# Patient Record
Sex: FEMALE | Race: WHITE | Employment: UNEMPLOYED | URBAN - METROPOLITAN AREA
[De-identification: names, ages, dates, MRNs, and addresses within clinical notes are randomized per-mention and may not be internally consistent; named-entity substitution may affect disease eponyms.]

---

## 2018-02-28 ENCOUNTER — OFFICE VISIT (OUTPATIENT)
Dept: SURGERY | Age: 43
End: 2018-02-28

## 2018-02-28 VITALS
TEMPERATURE: 98.4 F | WEIGHT: 223.2 LBS | SYSTOLIC BLOOD PRESSURE: 137 MMHG | HEIGHT: 62 IN | OXYGEN SATURATION: 100 % | DIASTOLIC BLOOD PRESSURE: 87 MMHG | RESPIRATION RATE: 15 BRPM | BODY MASS INDEX: 41.07 KG/M2 | HEART RATE: 72 BPM

## 2018-02-28 DIAGNOSIS — E55.9 HYPOVITAMINOSIS D: ICD-10-CM

## 2018-02-28 DIAGNOSIS — E66.01 MORBID OBESITY (HCC): Primary | ICD-10-CM

## 2018-02-28 RX ORDER — ALBUTEROL SULFATE 90 UG/1
AEROSOL, METERED RESPIRATORY (INHALATION)
COMMUNITY
End: 2020-08-24 | Stop reason: SDUPTHER

## 2018-02-28 RX ORDER — ESTRADIOL 2 MG/1
TABLET ORAL
Refills: 5 | COMMUNITY
Start: 2018-01-11

## 2018-02-28 RX ORDER — HYDROXYCHLOROQUINE SULFATE 200 MG/1
TABLET, FILM COATED ORAL
Refills: 1 | COMMUNITY
Start: 2018-01-11 | End: 2019-05-03 | Stop reason: ALTCHOICE

## 2018-02-28 RX ORDER — ERGOCALCIFEROL 1.25 MG/1
50000 CAPSULE ORAL
COMMUNITY
Start: 2016-11-08 | End: 2019-07-02

## 2018-02-28 NOTE — PATIENT INSTRUCTIONS
If you have any questions or concerns about today's appointment, the verbal and/or written instructions you were given for follow up care, please call our office at [phone].     [facility] - [address]    [phone] office  [phone]

## 2018-02-28 NOTE — LETTER
2018 2:06 PM 
 
Patient:  Osvaldo Doe YOB: 1975 Date of Visit: 2018 MD [address] 
[address] [address] [facility] [address] VIA Facsimile: [phone] Dear Brenda Clark MD, Thank you for referring Ms. Jana Lucas to [address] [id number] for evaluation and treatment. Below are the relevant portions of my assessment and plan of care. Initial Consultation for Bariatric Surgery Template (Gastric Sleeve) Osvaldo Doe is a 41 y.o. female who comes into the office today for initial consultation for the surgical options for the treatment of morbid obesity. The patient initially identified obesity at the age of 27 and at age 27 weighed 80 lbs. She has tried a variety of unsupervised weight-loss attempts including Weight Watchers and family physician, but has yet to meet with lasting success. Maximum weight lost on a diet is about 25 lbs, but that the weight loss always seems to return. Today, the patient is  Height: 5' 2\" (157.5 cm) tall, Weight: 101.2 kg (223 lb 3.2 oz) lbs for a Body mass index is 40.82 kg/(m^2). It is due to the patient's severe obesity, that the patient is now seeking out bariatric surgery, specifically, sleeve gastrectomy. Past Medical History:  
Diagnosis Date  Arthritis  Autoimmune disease ([address] Rheumatoid arthrtis  Sleep apnea Past Surgical History:  
Procedure Laterality Date [address] [facility] [address]  HX  SECTION    
 HX CHOLECYSTECTOMY  HX HYSTERECTOMY   Current Outpatient Prescriptions Medication Sig Dispense Refill  albuterol (PROVENTIL HFA, VENTOLIN HFA, PROAIR HFA) 90 mcg/actuation inhaler Take  inhaled by mouth.  ergocalciferol (ERGOCALCIFEROL) 50,000 unit capsule 50,000 Units.  ESOMEPRAZOLE MAGNESIUM (NEXIUM PO) Take  by Mouth.  hydroxychloroquine (PLAQUENIL) 200 mg tablet TAKE 1 TABLET BY MOUTH TWICE A DAY WITH FOOD OR MILK  1  
 estradiol (ESTRACE) 2 mg tablet TAKE 1 TABLET BY MOUTH EVERY DAY  5 Allergies Allergen Reactions  Promethazine Unknown (comments) Other reaction(s): psychological reaction Hallucinations Social History Substance Use Topics  Smoking status: Never Smoker  Smokeless tobacco: Never Used  Alcohol use No  
 
 
Family History Problem Relation Age of Onset  Diabetes Mother Elbessy Liz Arthritis-osteo Mother  Hypertension Mother  Diabetes Father  COPD Father Family Status Relation Status  Mother Alive  Father Alive Review of Systems:  Positive in BOLD 
 
CONST: Fever, weight loss, fatigue or chills GI: Nausea, vomiting, abdominal pain, change in bowel habits, hematochezia, melena, and GERD and Cleveland's esophagus INTEG: Dermatitis, abnormal moles HEENT: Recent changes in vision, vertigo, epistaxis, dysphagia and hoarseness CV: Chest pain, palpitations, HTN, edema and varicosities RESP: Cough, shortness of breath, wheezing, hemoptysis, snoring and reactive airway disease : Hematuria, dysuria, frequency, urgency, nocturia and stress urinary incontinence MS: Weakness, joint pain and arthritis ENDO: Diabetes, thyroid disease, polyuria, polydipsia, polyphagia, poor wound healing, heat intolerance, cold intolerance LYMPH/HEME: Anemia, bruising and history of blood transfusions NEURO: Dizziness, headache, fainting, seizures and stroke PSYCH: Anxiety and depression Physical Exam 
 
Visit Vitals  /87 (BP 1 Location: Right arm, BP Patient Position: Sitting)  Pulse 72  Temp 98.4 °F (36.9 °C) (Oral)  Resp 15  Ht 5' 2\" (1.575 m)  Wt 101.2 kg (223 lb 3.2 oz)  LMP Comment: hysterectomy 04/2014  SpO2 100%  BMI 40.82 kg/m2 Pre op weight: 223.2 EBW: 98.2 Wt loss to date: 0 General: 41 y.o.) female in no acute distress. Morbidly obese in abdomen and hips - gynecoid pattern HEENT: Normocephalic, atraumatic, Pupils equal and reactive, nasopharynx clear, oropharynx clear and moist without lesions NECK: Supple, no lymphadenopathy, thyromegaly, carotid bruits or jugular venous distension. trachea midline RESP: Clear to auscultation bilaterally, no wheezes, rhonchi, or rales, normal respiratory excursion CV: Regular rate and rhythm, no murmurs, rubs or gallops. 3+/4 pulses in bilateral dorsalis pedis and posterior tibialis. No distal edema or varicosities. ABD: Soft, nontender, nondistended, normoactive bowel sounds, no hernias, no hepatosplenomegaly, easily palpable costal margins, gynecoid distribution healed pfannenstiel and lap scars. Extremities: Warm, well perfused, no tenderness or swelling, normal gait/station Neuro: Sensation and strength grossly intact and symmetrical 
Psych: Alert and oriented to person, place, and time. Impression: 
 
Kerney Boas is a 41 y.o. female who is suffering from morbid obesity with a BMI of 41  and comorbidities including obstructive sleep apnea - CPAP and weight related arthopathies  who would benefit from bariatric surgery. We have had an extensive discussion with regard to the risks, benefits and likely outcomes of the operation. We've discussed the restrictive and malabsorptive nature of the gastric bypass and compared and contrasted with the sleeve gastrectomy. The patient understands the likelihood of losing approximately 80% of their excess weight in 12 to 18 months. She also understands the risks including but not limited to bleeding, infection, need for reoperation, ulcers, leaks and strictures, bowel obstruction secondary to adhesions and internal hernias, DVT, PE, heart attack, stroke, and death.  Patient also understands risks of inadequate weight loss, excess weight loss, vitamin insufficiency, protein malnutrition, excess skin, and loss of hair. We have reviewed the components of a successful postoperative course including requirement for a high protein, low carbohydrate diet, 60 oz a day of zero calorie liquids, daily vitamin supplementation, daily exercise, regular follow-up, and participation in support groups. At this time we will enroll the patient in our bariatric program, undertake routine laboratory evaluation, chest X-ray, EKG, possible UGI and evaluation by  nutritionist as well as psychologist and pending their satisfactory completion of the preop evaluation, plan to perform a gastric bypass. Thank you very much for your referral of Ms. Cas Vernon. If you have questions, please do not hesitate to call me. I look forward to following Ms. Alicia Cuevas along with you and will keep you updated as to her progress.   
 
 
 
 
Sincerely, 
 
 
Deirdre Dsouza MD

## 2018-02-28 NOTE — PROGRESS NOTES
Chief Complaint   Patient presents with    Morbid Obesity     consult     Body mass index is 40.82 kg/(m^2).

## 2018-02-28 NOTE — COMMUNICATION BODY
Initial Consultation for Bariatric Surgery Template (Gastric Sleeve)    Amanda Guardado is a 41 y.o. female who comes into the office today for initial consultation for the surgical options for the treatment of morbid obesity. The patient initially identified obesity at the age of 27 and at age 27 weighed 80 lbs. She has tried a variety of unsupervised weight-loss attempts including Weight Watchers and family physician, but has yet to meet with lasting success. Maximum weight lost on a diet is about 25 lbs, but that the weight loss always seems to return. Today, the patient is  Height: 5' 2\" (157.5 cm) tall, Weight: 101.2 kg (223 lb 3.2 oz) lbs for a Body mass index is 40.82 kg/(m^2). It is due to the patient's severe obesity, that the patient is now seeking out bariatric surgery, specifically, sleeve gastrectomy. Past Medical History:   Diagnosis Date    Arthritis     Autoimmune disease ([address]     Rheumatoid arthrtis    Sleep apnea        Past Surgical History:   Procedure Laterality Date    HX APPENDECTOMY      HX  SECTION      HX CHOLECYSTECTOMY      HX HYSTERECTOMY         Current Outpatient Prescriptions   Medication Sig Dispense Refill    albuterol (PROVENTIL HFA, VENTOLIN HFA, PROAIR HFA) 90 mcg/actuation inhaler Take  inhaled by mouth.  ergocalciferol (ERGOCALCIFEROL) 50,000 unit capsule 50,000 Units.  ESOMEPRAZOLE MAGNESIUM (NEXIUM PO) Take  by Mouth.       hydroxychloroquine (PLAQUENIL) 200 mg tablet TAKE 1 TABLET BY MOUTH TWICE A DAY WITH FOOD OR MILK  1    estradiol (ESTRACE) 2 mg tablet TAKE 1 TABLET BY MOUTH EVERY DAY  5       Allergies   Allergen Reactions    Promethazine Unknown (comments)     Other reaction(s): psychological reaction  Hallucinations       Social History   Substance Use Topics    Smoking status: Never Smoker    Smokeless tobacco: Never Used    Alcohol use No       Family History   Problem Relation Age of Onset    Diabetes Mother    Pat Castillo Arthritis-osteo Mother     Hypertension Mother     Diabetes Father     COPD Father        Family Status   Relation Status    Mother [de-identified]    Father Alive       Review of Systems:  Positive in BOLD    CONST: Fever, weight loss, fatigue or chills  GI: Nausea, vomiting, abdominal pain, change in bowel habits, hematochezia, melena, and GERD and Cleveland's esophagus   INTEG: Dermatitis, abnormal moles  HEENT: Recent changes in vision, vertigo, epistaxis, dysphagia and hoarseness  CV: Chest pain, palpitations, HTN, edema and varicosities  RESP: Cough, shortness of breath, wheezing, hemoptysis, snoring and reactive airway disease  : Hematuria, dysuria, frequency, urgency, nocturia and stress urinary incontinence   MS: Weakness, joint pain and arthritis  ENDO: Diabetes, thyroid disease, polyuria, polydipsia, polyphagia, poor wound healing, heat intolerance, cold intolerance  LYMPH/HEME: Anemia, bruising and history of blood transfusions  NEURO: Dizziness, headache, fainting, seizures and stroke  PSYCH: Anxiety and depression      Physical Exam    Visit Vitals    /87 (BP 1 Location: Right arm, BP Patient Position: Sitting)    Pulse 72    Temp 98.4 °F (36.9 °C) (Oral)    Resp 15    Ht 5' 2\" (1.575 m)    Wt 101.2 kg (223 lb 3.2 oz)    LMP Comment: hysterectomy 04/2014    SpO2 100%    BMI 40.82 kg/m2       Pre op weight: 223.2  EBW: 98.2  Wt loss to date: 0       General: 41 y.o.) female in no acute distress. Morbidly obese in abdomen and hips - gynecoid pattern  HEENT: Normocephalic, atraumatic, Pupils equal and reactive, nasopharynx clear, oropharynx clear and moist without lesions  NECK: Supple, no lymphadenopathy, thyromegaly, carotid bruits or jugular venous distension. trachea midline  RESP: Clear to auscultation bilaterally, no wheezes, rhonchi, or rales, normal respiratory excursion  CV: Regular rate and rhythm, no murmurs, rubs or gallops.  3+/4 pulses in bilateral dorsalis pedis and posterior tibialis. No distal edema or varicosities. ABD: Soft, nontender, nondistended, normoactive bowel sounds, no hernias, no hepatosplenomegaly, easily palpable costal margins, gynecoid distribution healed pfannenstiel and lap scars. Extremities: Warm, well perfused, no tenderness or swelling, normal gait/station  Neuro: Sensation and strength grossly intact and symmetrical  Psych: Alert and oriented to person, place, and time. Impression:    Piyush Pelayo is a 41 y.o. female who is suffering from morbid obesity with a BMI of 41  and comorbidities including obstructive sleep apnea - CPAP and weight related arthopathies  who would benefit from bariatric surgery. We have had an extensive discussion with regard to the risks, benefits and likely outcomes of the operation. We've discussed the restrictive and malabsorptive nature of the gastric bypass and compared and contrasted with the sleeve gastrectomy. The patient understands the likelihood of losing approximately 80% of their excess weight in 12 to 18 months. She also understands the risks including but not limited to bleeding, infection, need for reoperation, ulcers, leaks and strictures, bowel obstruction secondary to adhesions and internal hernias, DVT, PE, heart attack, stroke, and death. Patient also understands risks of inadequate weight loss, excess weight loss, vitamin insufficiency, protein malnutrition, excess skin, and loss of hair. We have reviewed the components of a successful postoperative course including requirement for a high protein, low carbohydrate diet, 60 oz a day of zero calorie liquids, daily vitamin supplementation, daily exercise, regular follow-up, and participation in support groups.  At this time we will enroll the patient in our bariatric program, undertake routine laboratory evaluation, chest X-ray, EKG, possible UGI and evaluation by  nutritionist as well as psychologist and pending their satisfactory completion of the preop evaluation, plan to perform a gastric bypass.

## 2018-02-28 NOTE — PROGRESS NOTES
Initial Consultation for Bariatric Surgery Template (Gastric Sleeve)    Ivett Trent is a 41 y.o. female who comes into the office today for initial consultation for the surgical options for the treatment of morbid obesity. The patient initially identified obesity at the age of 27 and at age 27 weighed 80 lbs. She has tried a variety of unsupervised weight-loss attempts including Weight Watchers and family physician, but has yet to meet with lasting success. Maximum weight lost on a diet is about 25 lbs, but that the weight loss always seems to return. Today, the patient is  Height: 5' 2\" (157.5 cm) tall, Weight: 101.2 kg (223 lb 3.2 oz) lbs for a Body mass index is 40.82 kg/(m^2). It is due to the patient's severe obesity, that the patient is now seeking out bariatric surgery, specifically, sleeve gastrectomy. Past Medical History:   Diagnosis Date    Arthritis     Autoimmune disease ([address]     Rheumatoid arthrtis    Sleep apnea        Past Surgical History:   Procedure Laterality Date    HX APPENDECTOMY      HX  SECTION      HX CHOLECYSTECTOMY      HX HYSTERECTOMY         Current Outpatient Prescriptions   Medication Sig Dispense Refill    albuterol (PROVENTIL HFA, VENTOLIN HFA, PROAIR HFA) 90 mcg/actuation inhaler Take  inhaled by mouth.  ergocalciferol (ERGOCALCIFEROL) 50,000 unit capsule 50,000 Units.  ESOMEPRAZOLE MAGNESIUM (NEXIUM PO) Take  by Mouth.       hydroxychloroquine (PLAQUENIL) 200 mg tablet TAKE 1 TABLET BY MOUTH TWICE A DAY WITH FOOD OR MILK  1    estradiol (ESTRACE) 2 mg tablet TAKE 1 TABLET BY MOUTH EVERY DAY  5       Allergies   Allergen Reactions    Promethazine Unknown (comments)     Other reaction(s): psychological reaction  Hallucinations       Social History   Substance Use Topics    Smoking status: Never Smoker    Smokeless tobacco: Never Used    Alcohol use No       Family History   Problem Relation Age of Onset    Diabetes Mother    [facility] Arthritis-osteo Mother     Hypertension Mother     Diabetes Father     COPD Father        Family Status   Relation Status    Mother [de-identified]    Father Alive       Review of Systems:  Positive in BOLD    CONST: Fever, weight loss, fatigue or chills  GI: Nausea, vomiting, abdominal pain, change in bowel habits, hematochezia, melena, and GERD and Cleveland's esophagus   INTEG: Dermatitis, abnormal moles  HEENT: Recent changes in vision, vertigo, epistaxis, dysphagia and hoarseness  CV: Chest pain, palpitations, HTN, edema and varicosities  RESP: Cough, shortness of breath, wheezing, hemoptysis, snoring and reactive airway disease  : Hematuria, dysuria, frequency, urgency, nocturia and stress urinary incontinence   MS: Weakness, joint pain and arthritis  ENDO: Diabetes, thyroid disease, polyuria, polydipsia, polyphagia, poor wound healing, heat intolerance, cold intolerance  LYMPH/HEME: Anemia, bruising and history of blood transfusions  NEURO: Dizziness, headache, fainting, seizures and stroke  PSYCH: Anxiety and depression      Physical Exam    Visit Vitals    /87 (BP 1 Location: Right arm, BP Patient Position: Sitting)    Pulse 72    Temp 98.4 °F (36.9 °C) (Oral)    Resp 15    Ht 5' 2\" (1.575 m)    Wt 101.2 kg (223 lb 3.2 oz)    LMP Comment: hysterectomy 04/2014    SpO2 100%    BMI 40.82 kg/m2       Pre op weight: 223.2  EBW: 98.2  Wt loss to date: 0       General: 41 y.o.) female in no acute distress. Morbidly obese in abdomen and hips - gynecoid pattern  HEENT: Normocephalic, atraumatic, Pupils equal and reactive, nasopharynx clear, oropharynx clear and moist without lesions  NECK: Supple, no lymphadenopathy, thyromegaly, carotid bruits or jugular venous distension. trachea midline  RESP: Clear to auscultation bilaterally, no wheezes, rhonchi, or rales, normal respiratory excursion  CV: Regular rate and rhythm, no murmurs, rubs or gallops.  3+/4 pulses in bilateral dorsalis pedis and posterior tibialis. No distal edema or varicosities. ABD: Soft, nontender, nondistended, normoactive bowel sounds, no hernias, no hepatosplenomegaly, easily palpable costal margins, gynecoid distribution healed pfannenstiel and lap scars. Extremities: Warm, well perfused, no tenderness or swelling, normal gait/station  Neuro: Sensation and strength grossly intact and symmetrical  Psych: Alert and oriented to person, place, and time. Impression:    Joanie Rodriguez is a 41 y.o. female who is suffering from morbid obesity with a BMI of 41  and comorbidities including obstructive sleep apnea - CPAP and weight related arthopathies  who would benefit from bariatric surgery. We have had an extensive discussion with regard to the risks, benefits and likely outcomes of the operation. We've discussed the restrictive and malabsorptive nature of the gastric bypass and compared and contrasted with the sleeve gastrectomy. The patient understands the likelihood of losing approximately 80% of their excess weight in 12 to 18 months. She also understands the risks including but not limited to bleeding, infection, need for reoperation, ulcers, leaks and strictures, bowel obstruction secondary to adhesions and internal hernias, DVT, PE, heart attack, stroke, and death. Patient also understands risks of inadequate weight loss, excess weight loss, vitamin insufficiency, protein malnutrition, excess skin, and loss of hair. We have reviewed the components of a successful postoperative course including requirement for a high protein, low carbohydrate diet, 60 oz a day of zero calorie liquids, daily vitamin supplementation, daily exercise, regular follow-up, and participation in support groups.  At this time we will enroll the patient in our bariatric program, undertake routine laboratory evaluation, chest X-ray, EKG, possible UGI and evaluation by  nutritionist as well as psychologist and pending their satisfactory completion of the preop evaluation, plan to perform a gastric bypass.

## 2018-03-27 ENCOUNTER — DOCUMENTATION ONLY (OUTPATIENT)
Dept: SURGERY | Age: 43
End: 2018-03-27

## 2018-03-27 NOTE — PROGRESS NOTES
[facility]  [address] [facility] [address] [facility], [facility]    Patient's Name: Piyush Pelayo                                  Age: 41 y.o. YOB: 1975                                          Sex: female  Date: 03/22/2018  Insurance: [employer]                          Session: 1 of 3               Surgeon:  Dr. Harmony Rascon    Height: 5'2\"                    Weight: 225#           Starting weight with surgeon: 223#          Do you smoke?: no    Alcohol Intake:   I do not drink at all:x      Class Guidelines    Pt. Understand that if they miss a month, depending on insurance, they may have to start over. Pt. Also understand that the expectation is to lose  Or maintain weight. Weight gain may result in delaying surgery until the weight is off. EATING HABITS AND BEHAVIORS:  Pt. Struggles With:  Liquid calories:   Skipping breakfast: x  Eating foods with a high amount of carbohydrates:   Eating High fat foods:   Eating large portions:   Making poor snack choices:  Eating out frequently: x  Skipping meals: x  Reading labels:   Drinking >48 oz fluids daily:   Getting sufficient physical activity/exercise:   Night time eating/snacking: x  Binge eating:   Eating often, even when not hungry (grazing):   Giving into peer pressure regarding eating/drinking:   Other:     Each class we spend time discussing the pre-op diet, diet progression and vitamin/mineral requirements as well as the Key Diet Principles. Each class we also cover lowering carbohydrate consumption. Keeping carbohydrates <25 grams per meal and avoiding added sugars is emphasized. Patient is educated on the effects that  carbohydrates and bad fats have on them. PHYSICAL ACTIVITY/EXERCISE:   Patient's activity is increasing because patient plans to or is:  Walking more: x  Other aerobic activity such as running, swimming, Gael, kickboxing ect. : x  Chair exercises:    Active in resistance training such as weight lifting:   Other:     Each class we spend time discussing the importance of increasing activity. Patient can start with 10 minutes of walking daily or chair exercises and build from there. BEHAVIOR MODIFICATION:  Making modifications to behavior, patient plans to or is:  Eating only when hungry not to treat stress, anger, tiredness or boredom:   Eating away from TV, computer and phone: x  Eating on a small plate: x  Eats slowly, chewing food well: x  Other: We spend time in each class discussing the importance of breaking bad habits and how to do this. I encourage pt.s to self evaluate and look for those crucial moments in which they are making these poor choices. I recommend a food journal which can help identify when/where//why/who we are with when we compromise and make exceptions that are poor choices. ADDITIONAL INFORMATION:  Specific changes patient made since the last class:  1st visit.     Brunilda Mora RD  03/22/2018

## 2018-04-19 ENCOUNTER — HOSPITAL ENCOUNTER (OUTPATIENT)
Dept: LAB | Age: 43
Discharge: HOME | End: 2018-04-19
Payer: COMMERCIAL

## 2018-04-19 ENCOUNTER — HOSPITAL ENCOUNTER (OUTPATIENT)
Dept: PREADMISSION TESTING | Age: 43
Discharge: HOME | End: 2018-04-19
Payer: COMMERCIAL

## 2018-04-19 DIAGNOSIS — E55.9 HYPOVITAMINOSIS D: ICD-10-CM

## 2018-04-19 DIAGNOSIS — E66.01 MORBID OBESITY (HCC): ICD-10-CM

## 2018-04-19 LAB
25(OH)D3 SERPL-MCNC: 27 NG/ML (ref 30–100)
ALBUMIN SERPL-MCNC: 4.4 G/DL (ref 3.4–5)
ALBUMIN/GLOB SERPL: 1.3 {RATIO} (ref 0.8–1.7)
ALP SERPL-CCNC: 108 U/L (ref 45–117)
ALT SERPL-CCNC: 39 U/L (ref 13–56)
ANION GAP SERPL CALC-SCNC: 10 MMOL/L (ref 3–18)
AST SERPL-CCNC: 21 U/L (ref 15–37)
ATRIAL RATE: 67 BPM
BASOPHILS # BLD: 0 K/UL (ref 0–0.06)
BASOPHILS NFR BLD: 0 % (ref 0–2)
BILIRUB SERPL-MCNC: 0.6 MG/DL (ref 0.2–1)
BUN SERPL-MCNC: 14 MG/DL (ref 7–18)
BUN/CREAT SERPL: 20 (ref 12–20)
CALCIUM SERPL-MCNC: 9.4 MG/DL (ref 8.5–10.1)
CALCULATED P AXIS, ECG09: 52 DEGREES
CALCULATED R AXIS, ECG10: 30 DEGREES
CALCULATED T AXIS, ECG11: 36 DEGREES
CHLORIDE SERPL-SCNC: 103 MMOL/L (ref 100–108)
CHOLEST SERPL-MCNC: 165 MG/DL
CO2 SERPL-SCNC: 28 MMOL/L (ref 21–32)
CREAT SERPL-MCNC: 0.69 MG/DL (ref 0.6–1.3)
DIAGNOSIS, 93000: NORMAL
DIFFERENTIAL METHOD BLD: ABNORMAL
EOSINOPHIL # BLD: 0.3 K/UL (ref 0–0.4)
EOSINOPHIL NFR BLD: 4 % (ref 0–5)
ERYTHROCYTE [DISTWIDTH] IN BLOOD BY AUTOMATED COUNT: 13.3 % (ref 11.6–14.5)
FERRITIN SERPL-MCNC: 52 NG/ML (ref 8–388)
FOLATE SERPL-MCNC: 17.2 NG/ML (ref 3.1–17.5)
GLOBULIN SER CALC-MCNC: 3.4 G/DL (ref 2–4)
GLUCOSE SERPL-MCNC: 89 MG/DL (ref 74–99)
HCT VFR BLD AUTO: 45.7 % (ref 35–45)
HDLC SERPL-MCNC: 49 MG/DL (ref 40–60)
HDLC SERPL: 3.4 {RATIO} (ref 0–5)
HGB BLD-MCNC: 15.2 G/DL (ref 12–16)
IRON SERPL-MCNC: 46 UG/DL (ref 50–175)
LDLC SERPL CALC-MCNC: 93.6 MG/DL (ref 0–100)
LIPID PROFILE,FLP: NORMAL
LYMPHOCYTES # BLD: 3.1 K/UL (ref 0.9–3.6)
LYMPHOCYTES NFR BLD: 41 % (ref 21–52)
MCH RBC QN AUTO: 29.2 PG (ref 24–34)
MCHC RBC AUTO-ENTMCNC: 33.3 G/DL (ref 31–37)
MCV RBC AUTO: 87.7 FL (ref 74–97)
MONOCYTES # BLD: 0.4 K/UL (ref 0.05–1.2)
MONOCYTES NFR BLD: 6 % (ref 3–10)
NEUTS SEG # BLD: 3.7 K/UL (ref 1.8–8)
NEUTS SEG NFR BLD: 49 % (ref 40–73)
P-R INTERVAL, ECG05: 134 MS
PLATELET # BLD AUTO: 299 K/UL (ref 135–420)
PMV BLD AUTO: 10.8 FL (ref 9.2–11.8)
POTASSIUM SERPL-SCNC: 3.9 MMOL/L (ref 3.5–5.5)
PROT SERPL-MCNC: 7.8 G/DL (ref 6.4–8.2)
Q-T INTERVAL, ECG07: 424 MS
QRS DURATION, ECG06: 86 MS
QTC CALCULATION (BEZET), ECG08: 448 MS
RBC # BLD AUTO: 5.21 M/UL (ref 4.2–5.3)
SODIUM SERPL-SCNC: 141 MMOL/L (ref 136–145)
TRIGL SERPL-MCNC: 112 MG/DL (ref ?–150)
TSH SERPL DL<=0.05 MIU/L-ACNC: 1.63 UIU/ML (ref 0.36–3.74)
VENTRICULAR RATE, ECG03: 67 BPM
VIT B12 SERPL-MCNC: 490 PG/ML (ref 211–911)
VLDLC SERPL CALC-MCNC: 22.4 MG/DL
WBC # BLD AUTO: 7.5 K/UL (ref 4.6–13.2)

## 2018-04-19 PROCEDURE — 83540 ASSAY OF IRON: CPT | Performed by: SURGERY

## 2018-04-19 PROCEDURE — 85025 COMPLETE CBC W/AUTO DIFF WBC: CPT | Performed by: SURGERY

## 2018-04-19 PROCEDURE — 82607 VITAMIN B-12: CPT | Performed by: SURGERY

## 2018-04-19 PROCEDURE — 80061 LIPID PANEL: CPT | Performed by: SURGERY

## 2018-04-19 PROCEDURE — 93005 ELECTROCARDIOGRAM TRACING: CPT

## 2018-04-19 PROCEDURE — 82728 ASSAY OF FERRITIN: CPT | Performed by: SURGERY

## 2018-04-19 PROCEDURE — 84443 ASSAY THYROID STIM HORMONE: CPT | Performed by: SURGERY

## 2018-04-19 PROCEDURE — 82306 VITAMIN D 25 HYDROXY: CPT | Performed by: SURGERY

## 2018-04-19 PROCEDURE — 80053 COMPREHEN METABOLIC PANEL: CPT | Performed by: SURGERY

## 2018-04-19 PROCEDURE — 84425 ASSAY OF VITAMIN B-1: CPT | Performed by: SURGERY

## 2018-04-21 LAB — VIT B1 BLD-SCNC: 124.2 NMOL/L (ref 66.5–200)

## 2018-04-25 ENCOUNTER — OFFICE VISIT (OUTPATIENT)
Dept: SURGERY | Age: 43
End: 2018-04-25

## 2018-04-25 VITALS
RESPIRATION RATE: 20 BRPM | HEIGHT: 62 IN | HEART RATE: 69 BPM | SYSTOLIC BLOOD PRESSURE: 127 MMHG | TEMPERATURE: 97.8 F | WEIGHT: 219 LBS | BODY MASS INDEX: 40.3 KG/M2 | DIASTOLIC BLOOD PRESSURE: 95 MMHG

## 2018-04-25 DIAGNOSIS — E66.01 MORBID OBESITY (HCC): Primary | ICD-10-CM

## 2018-04-25 DIAGNOSIS — E55.9 HYPOVITAMINOSIS D: ICD-10-CM

## 2018-04-25 DIAGNOSIS — Z99.89 OSA ON CPAP: ICD-10-CM

## 2018-04-25 DIAGNOSIS — G47.33 OSA ON CPAP: ICD-10-CM

## 2018-04-25 NOTE — PROGRESS NOTES
Bariatric Midtrial   Returns during WLT to discuss status. Having no issues. Making positive changes. Still wants a bypass    Past Medical History:   Diagnosis Date    Arthritis     Autoimmune disease ([address]     Rheumatoid arthrtis    Sleep apnea      Past Surgical History:   Procedure Laterality Date    HX APPENDECTOMY      HX  SECTION      HX CHOLECYSTECTOMY      HX HYSTERECTOMY       Allergies   Allergen Reactions    Promethazine Unknown (comments)     Other reaction(s): psychological reaction  Hallucinations     Current Outpatient Prescriptions   Medication Sig Dispense Refill    albuterol (PROVENTIL HFA, VENTOLIN HFA, PROAIR HFA) 90 mcg/actuation inhaler Take  inhaled by mouth.  ergocalciferol (ERGOCALCIFEROL) 50,000 unit capsule 50,000 Units.  ESOMEPRAZOLE MAGNESIUM (NEXIUM PO) Take  by Mouth.       hydroxychloroquine (PLAQUENIL) 200 mg tablet TAKE 1 TABLET BY MOUTH TWICE A DAY WITH FOOD OR MILK  1    estradiol (ESTRACE) 2 mg tablet TAKE 1 TABLET BY MOUTH EVERY DAY  5     Social History     Social History    Marital status:      Spouse name: N/A    Number of children: N/A    Years of education: N/A     Social History Main Topics    Smoking status: Never Smoker    Smokeless tobacco: Never Used    Alcohol use No    Drug use: None    Sexual activity: Yes     Other Topics Concern    None     Social History Narrative     Family History   Problem Relation Age of Onset    Diabetes Mother     Arthritis-osteo Mother     Hypertension Mother    [place]  Diabetes Father     COPD Father      Family Status   Relation Status    Mother Alive    Father Alive       No change in ROS     Visit Vitals    BP (!) 127/95 (BP 1 Location: Left arm, BP Patient Position: At rest)    Pulse 69    Temp 97.8 °F (36.6 °C) (Oral)    Resp 20    Ht 5' 2\" (1.575 m)    Wt 99.3 kg (219 lb)    BMI 40.06 kg/m2       Pulm: CTA   CV: RRR   Abd: soft, nontender, easily palp costal margin and  no hernias     Labs: all ok, mild hypovit D   EKG - NSR   Psych- approved  Nutr - 1/3 complete  Imp:   Doing well   Proceed with completion of WLT. All questions answered. Hoping for surgery in July.

## 2018-04-25 NOTE — PROGRESS NOTES
Gael Wolf is a 41 y.o. female who presents today with   Chief Complaint   Patient presents with    Morbid Obesity     Mid Trial                1. Have you been to the ER, urgent care clinic since your last visit? Hospitalized since your last visit? No    2. Have you seen or consulted any other health care providers outside of the [facility] since your last visit? Include any pap smears or colon screening.  No

## 2018-04-25 NOTE — MR AVS SNAPSHOT
[facility] 
 
 
 [address] [place] [address] [id number] 
[phone] Patient: Marcy Lara MRN: [id number] :1975 Visit Information Date & Time Provider Department Dept. Phone Encounter #  
 2018 10:00 AM J Carlos Amin MD [facility] [phone] [id number] Your Appointments 2018 10:30 AM  
NUTRITION COUNSELING with [facility] DIETICIAN [facility] [place] ([address]) Appt Note: 3 of 3  
 [address] [place] [address]  
  
   
 [address] [place] [address] 2018  2:45 PM  
Office Visit with J Carlos Amin MD  
[address] [place] [address] [address]) Appt Note: ready to schedule surgery [address] [place] [address] [place]  
  
   
 [address] [facility] [address] Upcoming Health Maintenance Date Due DTaP/Tdap/Td series (1 - Tdap) 1998 PAP AKA CERVICAL CYTOLOGY 1998 Influenza Age 5 to Adult 2017 Allergies as of 2018  Never Reviewed Severity Noted Reaction Type Reactions Promethazine Medium 2008    Unknown (comments) Other reaction(s): psychological reaction Hallucinations Current Immunizations  Never Reviewed No immunizations on file. Not reviewed this visit You Were Diagnosed With   
  
 Codes Comments Morbid obesity ([facility]    -  Primary ICD-10-CM: E66.01 
ICD-9-CM: 278.01   
 BMI 40.0-44.9, adult ([facility]     ICD-10-CM: Z68.41 
ICD-9-CM: V85.41 Hypovitaminosis D     ICD-10-CM: E55.9 ICD-9-CM: 268.9 GIL on CPAP     ICD-10-CM: G47.33, Z99.89 ICD-9-CM: 327.23, V46.8 Vitals BP Pulse Temp Resp Height(growth percentile) Weight(growth percentile) (!) 127/95 (BP 1 Location: Left arm, BP Patient Position: At rest) 69 97.8 °F (36.6 °C) (Oral) 20 5' 2\" (1.575 m) 219 lb (99.3 kg) BMI OB Status Smoking Status 40.06 kg/m2 Hysterectomy Never Smoker Vitals History BMI and BSA Data Body Mass Index Body Surface Area 40.06 kg/m 2 2.08 m 2 Your Updated Medication List  
  
   
This list is accurate as of 4/25/18 10:43 AM.  Always use your most recent med list.  
  
  
  
  
 albuterol 90 mcg/actuation inhaler Commonly known as:  PROVENTIL HFA, VENTOLIN HFA, PROAIR HFA Take  inhaled by mouth.  
  
 ergocalciferol 50,000 unit capsule Commonly known as:  ERGOCALCIFEROL  
50,000 Units. estradiol 2 mg tablet Commonly known as:  ESTRACE  
TAKE 1 TABLET BY MOUTH EVERY DAY  
  
 hydroxychloroquine 200 mg tablet Commonly known as:  PLAQUENIL  
TAKE 1 TABLET BY MOUTH TWICE A DAY WITH FOOD OR MILK NEXIUM PO Take  by Mouth. Introducing \[facility]\"" & HEALTH SERVICES! Susan Guevara introduces [employer] patient portal. Now you can access parts of your medical record, email your doctor's office, and request medication refills online. 1. In your internet browser, go to [email or web address] 2. Click on the First Time User? Click Here link in the Sign In box. You will see the New Member Sign Up page. 3. Enter your [employer] Access Code exactly as it appears below. You will not need to use this code after youve completed the sign-up process. If you do not sign up before the expiration date, you must request a new code. · [employer] Access Code: [email or web address] Expires: 5/29/2018  2:10 PM 
 
4. Enter the last four digits of your Social Security Number (xxxx) and Date of Birth (mm/dd/yyyy) as indicated and click Submit. You will be taken to the next sign-up page. 5. Create a [employer]Stage ID. This will be your [employer]Stage login ID and cannot be changed, so think of one that is secure and easy to remember. 6. Create a [employer] password. You can change your password at any time. 7. Enter your Password Reset Question and Answer. This can be used at a later time if you forget your password. 8. Enter your e-mail address. You will receive e-mail notification when new information is available in [address] 9. Click Sign Up. You can now view and download portions of your medical record. 10. Click the Download Summary menu link to download a portable copy of your medical information. If you have questions, please visit the Frequently Asked Questions section of the [employer] website. Remember, [employer] is NOT to be used for urgent needs. For medical emergencies, dial 911. Now available from your iPhone and Android! Please provide this summary of care documentation to your next provider. Your primary care clinician is listed as Grover Mulligan. If you have any questions after today's visit, please call [phone].

## 2018-04-30 ENCOUNTER — DOCUMENTATION ONLY (OUTPATIENT)
Dept: SURGERY | Age: 43
End: 2018-04-30

## 2018-04-30 NOTE — PROGRESS NOTES
[facility]  [address] [facility]    Patient's Name: Jesica Muse                                  Age: 41 y.o. YOB: 1975                                          Sex: female  Date: 04/19/2018  Insurance: [employer]                          Session: 2 of 3               Surgeon:  Dr. Juana Ashley    Height: 5'2\"                    Weight: 217#           Starting weight with surgeon: 223#          Do you smoke?: NO    Alcohol Intake:   I do not drink at all:X      Class Guidelines    Pt. Understand that if they miss a month, depending on insurance, they may have to start over. Pt. Also understand that the expectation is to lose  Or maintain weight. Weight gain may result in delaying surgery until the weight is off. EATING HABITS AND BEHAVIORS:  Pt. Struggles With:  Liquid calories:   Skipping breakfast:   Eating foods with a high amount of carbohydrates:   Eating High fat foods:   Eating large portions:   Making poor snack choices:  Eating out frequently:   Skipping meals:   Reading labels:   Drinking >48 oz fluids daily:   Getting sufficient physical activity/exercise:   Night time eating/snacking:   Binge eating:   Eating often, even when not hungry (grazing):   Giving into peer pressure regarding eating/drinking:   Other:     Each class we spend time discussing the pre-op diet, diet progression and vitamin/mineral requirements as well as the Key Diet Principles. Each class we also cover lowering carbohydrate consumption. Keeping carbohydrates <25 grams per meal and avoiding added sugars is emphasized. Patient is educated on the effects that  carbohydrates and bad fats have on them. PHYSICAL ACTIVITY/EXERCISE:   Patient's activity is increasing because patient plans to or is:  Walking more: X  Other aerobic activity such as running, swimming, Gael, kickboxing ect. Maria Luisa Bigger  Chair exercises:    Active in resistance training such as weight lifting: X  Other:     Each class we spend time discussing the importance of increasing activity. Patient can start with 10 minutes of walking daily or chair exercises and build from there. BEHAVIOR MODIFICATION:  Making modifications to behavior, patient plans to or is:  Eating only when hungry not to treat stress, anger, tiredness or boredom: X  Eating away from TV, computer and phone: X  Eating on a small plate: X  Eats slowly, chewing food well: Other: We spend time in each class discussing the importance of breaking bad habits and how to do this. I encourage pt.s to self evaluate and look for those crucial moments in which they are making these poor choices. I recommend a food journal which can help identify when/where//why/who we are with when we compromise and make exceptions that are poor choices. ADDITIONAL INFORMATION:  Specific changes patient made since the last class:  Using smaller plates, counting carbs, protein and sugar. RD's concerns/opinion's:  Patient knows she can't gain.     Rere Renner RD  04/18/2018

## 2018-05-17 ENCOUNTER — DOCUMENTATION ONLY (OUTPATIENT)
Dept: SURGERY | Age: 43
End: 2018-05-17

## 2018-05-17 NOTE — PROGRESS NOTES
[facility]  [address] [facility]    Patient's Name: Kristyn Jacobson                                  Age: 41 y.o. YOB: 1975                                          Sex: female  Date: 05/17/2018  Insurance: [employer]                          Session: 3 of 3               Surgeon:  Dr. Mary Mccloud    Height: 5'2\"                    Weight: 212#           Starting weight with surgeon: 223#          Do you smoke?: no    Alcohol Intake:   I do not drink at all:x      Class Guidelines    Pt. Understand that if they miss a month, depending on insurance, they may have to start over. Pt. Also understand that the expectation is to lose  Or maintain weight. Weight gain may result in delaying surgery until the weight is off. EATING HABITS AND BEHAVIORS:  Pt. Struggles With:  Liquid calories:   Skipping breakfast:   Eating foods with a high amount of carbohydrates:   Eating High fat foods:   Eating large portions:   Making poor snack choices:  Eating out frequently:   Skipping meals:   Reading labels:   Drinking >48 oz fluids daily:   Getting sufficient physical activity/exercise:   Night time eating/snacking:   Binge eating:   Eating often, even when not hungry (grazing):   Giving into peer pressure regarding eating/drinking:   Other:     Each class we spend time discussing the pre-op diet, diet progression and vitamin/mineral requirements as well as the Key Diet Principles. Each class we also cover lowering carbohydrate consumption. Keeping carbohydrates <25 grams per meal and avoiding added sugars is emphasized. Patient is educated on the effects that  carbohydrates and bad fats have on them. PHYSICAL ACTIVITY/EXERCISE:   Patient's activity is increasing because patient plans to or is:  Walking more: x   Other aerobic activity such as running, swimming, Gael, kickboxing ect.: x 2 TIMES PER WEEK I DO KICKBOXING AND RUNNING.   Chair exercises: Active in resistance training such as weight lifting: 3-4 TIMES PER WEEK  Other:     Each class we spend time discussing the importance of increasing activity. Patient can start with 10 minutes of walking daily or chair exercises and build from there. BEHAVIOR MODIFICATION:  Making modifications to behavior, patient plans to or is:  Eating only when hungry not to treat stress, anger, tiredness or boredom:   Eating away from TV, computer and phone:   Eating on a small plate:   Eats slowly, chewing food well: Other: We spend time in each class discussing the importance of breaking bad habits and how to do this. I encourage pt.s to self evaluate and look for those crucial moments in which they are making these poor choices. I recommend a food journal which can help identify when/where//why/who we are with when we compromise and make exceptions that are poor choices. ADDITIONAL INFORMATION:  Specific changes patient made since the last class:  Smaller portions, measuring everything, watching carbs and sugars very closely. RD's concerns/opinion's:  Patient has cone very well int the [facility] class. She has learned and applied much. She is cleared to proceed from a nutritional viewpoint.     Brent Sam, [address] [place] [address]  [phone]

## 2018-05-21 ENCOUNTER — TELEPHONE (OUTPATIENT)
Dept: SURGERY | Age: 43
End: 2018-05-21

## 2018-05-21 NOTE — TELEPHONE ENCOUNTER
Called patient and LVM that she needs letter from her PCP per the insurance company that this surgery is medically necessary.

## 2018-05-29 ENCOUNTER — DOCUMENTATION ONLY (OUTPATIENT)
Dept: SURGERY | Age: 43
End: 2018-05-29

## 2018-05-29 NOTE — PROGRESS NOTES
Patient has been educated on the pre-op, post-op diet protocol. Vitamins and minerals have also been covered.

## 2018-05-31 ENCOUNTER — DOCUMENTATION ONLY (OUTPATIENT)
Dept: SURGERY | Age: 43
End: 2018-05-31

## 2018-06-25 ENCOUNTER — DOCUMENTATION ONLY (OUTPATIENT)
Dept: MEDSURG UNIT | Age: 43
End: 2018-06-25

## 2018-06-25 NOTE — PROGRESS NOTES
Patient attended pre op class with Cierra and Evan Salinas. Patient was educated on all pre op instructions below. A copy was provided. All questions were answered  7 day Pre-Op LIQUID Diet    Benefits:  o Reducing intake before surgery will shrink your liver by  depleting glycogen. (a form of stored energy)  o Reduced liver size gives better access to stomach during  surgery; which translates to a safer surgery. o Prevents the \"last supper\" syndrome  o Experiencing weight loss before the procedure encourages  post-operative compliance and \"jump-starts\" weight loss    Specifics:  o Start 7 days before surgery:  ____________  o NO SOLID FOODS!!  o Your surgery will be CANCELED if this diet is not followed!!!  o Minimum of 64oz. of fluid daily, including protein drinks.  o No added sugar or carbonated beverages  o Continue to take all your prescribed medication and your vitamin supplements during this preoperative diet phase. CLEAR LIQUIDS:   Water   Sugar free, non-carbonated beverages (crystal light, propel)   Sugar free popsicles   Sugar free Jell-O   Fat free, reduced sodium broth    Decaffeinated coffee or decaffeinated tea with artificial sweeteners. PROTEIN:   60 grams of protein daily (in liquid supplements)   Pre-made protein drinks   Protein powder added to water    3 gram rule: limit sugar and fat to less than 3 grams per 8oz.  4-6 oz. low fat/low sugar yogurt OR cottage cheese 3-4 times during the week      Following Gastric Bypass surgery you will no longer be able to take NSAIDs (Non-Steriodal Anti-Inflammatory Drugs) EVER again. NSAIDs are the leading cause of stomach ulcers following Gastric Bypass surgery. (Patients having the Gastric Sleeve will not be able to take NSAIDs for 6 weeks following surgery.)      MOST COMMONLY TAKEN    NSAIDs    to be AVOIDED!! 1. Ibuprofen  2. Advil  3. Motrin  4. Excedrin  5. Aspirin  6. Celebrex  7. Naproxen  8. Aleve  9. Voltaren  10. Mobic    Attached is an extensive list of additional NSAIDs that cannot be taken following surgery. Please be sure to review this list when you are being prescribed new medications. This list covers the majority of NSAIDs on the market. Be aware that additional NSAIDs do exist and new medications are being introduced regularly. You must be your own advocate!! Non-Steriodal Anti-Inflammatory Drugs (NSAIDs)  ? The following is a list of NSAIDS that are NEVER to be taken again after Gastric Bypass surgery    Ibuprofen which is also known as : Advil, Advil Childrens, Advil Van Strength, Advil Liquigel, Advil Migraine, Advil Pediatric, Childrens Ibuprofen Berry, Genpril, IBU, Midol IB, Midol Maximum Strength Cramp Formula, Dolgesic, Motrin Childrens, Motrin IB, Motrin Infant Drops, Motrin Van Strength, Motrin Migraine Pain, Nuprin, Migraine Liqui-gels, Ibu-Tab 200, Cap-Profen, Tab-Profen, Profen, Ibuprohm, Childrens Elixsure, IB Pro, Vicoprofen, Combunox, A-G Profen, Actiprofen, Addaprin, Advil Infants Concentrated Drops, Caldolor, [place], Q-Profen, Ibifon 600, Ibren, Aguila, Midol Cramps & Bodyaches, [place], Shila, [place], [place], Uni-Pro, Wal-Profen    Aspirin which has the brand name: Arthritis Pain, Aspergum, Aspir-Low, Aspirin Lite Coat, [place] Aspirin, Bufferin, Easprin, Ecotrin, Empirin, Fasprin, [place], Halfprin, [place] Aspirin, [place] Aspirin, Excedrin    Ketoprofen which is known as: Orudis KT, Oruvail, Actron. Sulindac which is sold as: Clinoril.     Naproxen is one of the most common NSAIDs, and is sold as: Aleve, Naprosyn, EC-Naprosyn, Naprelan, Anaprox, All Day Pain Relief, Aflaxen, All Day Relief, Anaprox-DS, Comfort Pac  With Naproxen,  Aleve Caplet, Aleve Easy Open Arthritis, Aleve Gelcap,  Anaprox-DS, EC-Naprosyn, Leader Naproxen Sodium, Midol Extended Relief, Naprelan 375?, Naprelan 500?, Naprelan 750?, Prevacid NapraPac 375,  Prevacid NapraPac, Naprapac, Vimovo. Etodolac is sold under the brand name: Lodine XL, Lodine. Fenoprofen can be found on the market as: Nalfon, Nalfon 200. Diclofenac sold as: Arthrotec, Cataflam, Voltaren, Cambia, Voltaren-XR, Zipsor. Flurbiprofen sold as: Asaid. Ketorolac is sold under the brand name: Sprix, Toradol, Toradol IM, Toradol IV/IM. Piroxicam is found on the market as: Feldene. Indomethacin known as: Indocin SR, Indocin, Indo-Azar, Indomethegan, Indocin IV. Mefenamic Acid sold as: Ponstel. Meloxicam is sold under the brand name: Mobic    Nabumetone which is sold as: Relafen. Oxaprozin which has the brand name: Daypro    Ketoprofen which has the brand name: Actron, Orudis KT, Orudis, Oruvail    Famotidine and Ibuprofen can be found as: Duexis    Meclofenamate sold as: Meclomen    Tolmetin which can be found on the marked as: Tolectin, Tolectin 600, Tolectin DS    Salsalate which is sold as: Disalcid. Celecoxib which is sold as: Celebrex      Patients name: ________________________________  Date of surgery: ____________    Pre-op instructions:  1. Shower with the antibacterial soap  the 3 days prior to surgery. 2. Pre-admission testing will contact you 1 week before surgery  3. [facility] will contact you the day before surgery to confirm your surgery time.  Email or call your surgery scheduler if you have not heard from them by 3pm  4. Nothing to eat or drink (NPO) after midnight the night before surgery.  Take any evening medications by 11pm  5. Wear comfortable clothing. 6. Do not bring any valuables. 7. Bring insurance card, prescription card, photo ID and credit card to the hospital.  **Discuss all home medications with your surgeon at you pre-op appointment. Your surgeon will inform you of what medications to stop before surgery and what medications to take the day of surgery.     **STOP all NSAIDS (Ibuprofen, Aleve, Advil, Naprosyn etc.) and Aspirin 7 days before your surgery      Important Information:  1. No lifting over 15 lbs. for 6 weeks post-op  2. No driving for 1-16 days after surgery - must be off pain medication. 3. No smoking EVER again! 4. You will NOT be able to take any Non-Steroidal Anti-inflammatories (NSAIDS), Aspirin or Steroids  a. Bypass/revision patients - EVER again. (Tylenol only)  b. Sleeve patients - 6 weeks after surgery  5. Pulse/temperature- twice daily for 2 weeks post-op   6. Avoid pregnancy for 18 months  7. Key Diet principles:  a. Vitamin/mineral supplements-[place] Complete, Calcium citrate, Vitamin D3, Vitamin B12  b.  Protein supplements - 60-70 grams/day  c. Minimum 64 oz. fluid per day      What to Expect in the Hospital:  Pre-op area:  o Emergency Room  take elevator 2nd floor  o Arrive 2 hours before surgery  o Lovenox (blood thinner)  o Incentive Spirometer  o SCDs  o Sign consent  o Anesthesia  Start IV    Operating Room:    o Gastric bypass: 2- 2 ½ hours  o Sleeve gastrectomy: 1-1 ½ hours  o Revision: 2-3 hours    PACU(Post Anesthesia Care Unit):  o Nasal cannula  o EKG monitor  o Blood pressure cuff  o Pulse Ox  o Hall Catheter  o SCDs  o No family allowed  o Minimum one hour      2 Central (Day of Surgery):  o Private room  o 1-2 oz. ice chips per hour   o Pain Management ( Three Tier)  Tylenol given first- 650 mg PO  Oxycodone 5 mg PO   Dilaudid IV  Remember other alternatives to pain medication   Get patient out of bed and walking this helps tremendously  Position change or get patient up to the chair  Ice Ricci to Abdomen  o Void  o Walk within 4 hours  o One family member can spend the night (must 18 years or older)  o Cell phone/computers - OK    2 Central (Post-op Day 1/Post-op Day 2):  o Discontinue -nasal cannula and heart rate monitor  o Start bariatric clear liquid diet - water, crystal light, broth, Jell-O and protein supplement  o Slowly increase to 3 oz. clear liquids and 1 oz. protein supplement per hour  o Oral pain medication as needed for pain - communicate with your nurse  o Goal:  48 to 64 ounces, clear liquid per day preferable water  o Discharge instructions/Lovenox self-injection instructions   o WALK & WATER    Post-op Goals:  1. Void within 8 hours of your catheter being removed  2. Pain is well controlled with oral pain medication  3. Nausea is under control/No vomiting  4. Tolerating 3 oz. of clear liquids and 1 oz. protein supplement per hour for 3 consecutive hours. Post-op Follow-up Labs will need to be completed 1-2 weeks BEFORE your 3 month, 6 month and yearly visits. These labs are required and your appointment will be rescheduled if they are not completed. My surgical procedure and post-operative hospital stay has been discussed with me and I have been given the opportunity to ask any questions I may have. Patient Signature: ____________________________  Date: _____________________    THINGS I NEED TO KNOW BEFORE SURGERY  1. How many ounces of fluid will you need to drink every day after surgery? _______________    2. List 3 examples of bariatric clear liquids. ________________________  ________________________  ________________________  3. What is the 3 gram rule?   ______________________________________________________________      4. What is the 30 minute rule?  ______________________________________________________________      5. What are examples of food you will be able to eat on the bariatric soft and moist diet?  _________________________  _________________________  _________________________  6. After surgery I will be able to use a straw. TRUE    FALSE    7. After surgery I will NOT be able to drink carbonated beverages. TRUE    FALSE  8. After surgery I will be able to drink caffeine. TRUE   FALSE    9.  What 4 vitamins will you take after surgery?  ______________________           _________________________  _____________________ _________________________  10. How much exercise should you get daily? _________________    11. How long should it take you to eat a meal? ________________    12. The Calcium I have purchased is: ______________________. This has ________ mg per serving. I will need to take this ________ times a day. 13. The protein drink I have decided on is: ______________. This has _________ grams of protein. I will need to drink ______ of my protein drinks during the full liquid phase and _____ during the soft protein phase. My protein drinks will give me ______ ounces of fluid. 14. After having a sleeve gastrectomy I will not be able to take NSAIDs (Non-Steroidal Anti-inflammatory Drugs) for ______ weeks. 15. After having a gastric bypass I will not be able to take NSAIDs (Non-Steroidal Anti-Inflammatory Drugs) for _____________. PRE-OP CHECKLIST    THINGS TO DO AT MY PRE-OP APPOINTMENT WITH MY SURGEON:  ? Discuss ALL my current medications with my surgeon. Know what medications needs to be stopped before surgery AND what medications need to be taken on the morning of surgery. ? blood pressure/diuretic medications. ? Coumadin, Plavix or other blood thinners    ? Stop the following diabetic medications (if applicable): ____________________________________________________on: ______________  ? Use Regular Insulin (Novolog Pen) according to the following sliding scale: Insulin Sliding Scale  Blood sugar:  Amount of insulin:  Under 150  no insulin  150-200  2 units  201-250  4 units  251-300  6 units  301-350  8 units  351-400  10 units  400 or greater 12 units and call physician [phone]    THINGS TO DO FOLLOWING the PRE-OP CLASS:  ? Read through all information given to me by:  ? My dietitian Minor Amna or Manas)  ? Princess/Cierra at the Pre-op class    ?  Contact my insurance company to find out the out of pocket cost of Lovenox (enoxaparin). $____________      THINGS TO DO BEFORE SURGERY:    ? Start the pre-op liquid diet on: ___________    ? Stop all NSAIDS (see attached sheet with list of NSAIDs) and Aspirin 7 days before my surgery: ___________  ? Contact my doctor(PCP or surgeon) regarding stopping Coumadin, Plavix or other blood thinners    ? Purchase bariatric clear liquids (Crystal Lite, sugar free Jell-O, broth, sugar free popsicles, protein supplement) and bariatric soft and moist foods (low fat yogurt, cottage cheese, eggs, tuna, fish, chicken) so that Im prepared when I get home from the hospital.     ? Purchase all vitamins that will be required following surgery. 1. Chewable multivitamin - 2 per day(ex: Flintstones)  2. Calcium Citrate - 1500mg (500mg 3 times a day)  3. Vitamin B12 - 1000mcg daily  4. Vitamin D3 - 5000IU daily    ? Create a system to keep track of how many ounces of fluid I am drinking daily. ? Post-op GOAL: 64oz per day    ? Purchase a protein supplement that I like:  ? Brand: ______________    ? Ounces: __________   ?  Grams of protein per serving: _________

## 2018-06-26 RX ORDER — LANOLIN ALCOHOL/MO/W.PET/CERES
1000 CREAM (GRAM) TOPICAL DAILY
COMMUNITY
End: 2018-07-18 | Stop reason: ALTCHOICE

## 2018-06-26 RX ORDER — HYDROGEN PEROXIDE 3 %
20 SOLUTION, NON-ORAL MISCELLANEOUS DAILY
COMMUNITY
Start: 2018-05-22 | End: 2019-05-03 | Stop reason: ALTCHOICE

## 2018-06-26 RX ORDER — MAGNESIUM 200 MG
1000 TABLET ORAL DAILY
COMMUNITY

## 2018-06-26 RX ORDER — PEDIATRIC MULTIVITAMIN NO.17
1 TABLET,CHEWABLE ORAL DAILY
COMMUNITY

## 2018-06-26 NOTE — PERIOP NOTES
PAT - SURGICAL PRE-ADMISSION INSTRUCTIONS    NAME:  Sami Miranda                                                          TODAY'S DATE:  6/26/2018    SURGERY DATE:  7/2/2018                                  SURGERY ARRIVAL TIME:   0545    1. Do NOT eat or drink anything, including candy or gum, after MIDNIGHT on 07/01/18 , unless you have specific instructions from your Surgeon or Anesthesia Provider to do so. 2. No smoking on the day of surgery. 3. No alcohol 24 hours prior to the day of surgery. 4. No recreational drugs for one week prior to the day of surgery. 5. Leave all valuables, including money/purse, at home. 6. Remove all jewelry, nail polish, makeup (including mascara); no lotions, powders, deodorant, or perfume/cologne/after shave. 7. Glasses/Contact lenses and Dentures may be worn to the hospital.  They will be removed prior to surgery. 8. Call your doctor if symptoms of a cold or illness develop within 24 ours prior to surgery. 9. AN ADULT MUST DRIVE YOU HOME AFTER OUTPATIENT SURGERY. 10. If you are having an OUTPATIENT procedure, please make arrangements for a responsible adult to be with you for 24 hours after your surgery. 11. If you are admitted to the hospital, you will be assigned to a bed after surgery is complete. Normally a family member will not be able to see you until you are in your assigned bed. 15. Family is encouraged to accompany you to the hospital.  We ask visitors in the treatment area to be limited to ONE person at a time to ensure patient privacy. EXCEPTIONS WILL BE MADE AS NEEDED. 15. Children under 12 are discouraged from entering the treatment area and need to be supervised by an adult when in the waiting room. Special Instructions:    Bring inhaler., Bring CPAP. Patient Prep:    shower with anti-bacterial soap    These surgical instructions were reviewed with Cierra during the PAT phone call. Directions:   On the morning of surgery, please go to the [address] [facility]. Enter the building from the [facility] entrance, 1st floor (next to the Emergency Room entrance). Take the elevator to the 2nd floor. Sign in at the Registration Desk.     If you have any questions and/or concerns, please do not hesitate to call:  (Prior to the day of surgery)  [facility] unit:  [phone]  (Day of surgery)  [facility] unit:  [phone]

## 2018-06-29 ENCOUNTER — ANESTHESIA EVENT (OUTPATIENT)
Dept: SURGERY | Age: 43
DRG: 621 | End: 2018-06-29
Payer: COMMERCIAL

## 2018-07-02 ENCOUNTER — HOSPITAL ENCOUNTER (INPATIENT)
Age: 43
LOS: 1 days | Discharge: HOME | DRG: 621 | End: 2018-07-03
Attending: SURGERY | Admitting: SURGERY
Payer: COMMERCIAL

## 2018-07-02 ENCOUNTER — ANESTHESIA (OUTPATIENT)
Dept: SURGERY | Age: 43
DRG: 621 | End: 2018-07-02
Payer: COMMERCIAL

## 2018-07-02 DIAGNOSIS — E66.01 MORBID OBESITY (HCC): ICD-10-CM

## 2018-07-02 PROCEDURE — 77030010507 HC ADH SKN DERMBND J&J -B: Performed by: SURGERY

## 2018-07-02 PROCEDURE — 77030008683 HC TU ET CUF COVD -A: Performed by: ANESTHESIOLOGY

## 2018-07-02 PROCEDURE — 74011250636 HC RX REV CODE- 250/636: Performed by: SURGERY

## 2018-07-02 PROCEDURE — 76060000036 HC ANESTHESIA 2.5 TO 3 HR: Performed by: SURGERY

## 2018-07-02 PROCEDURE — 74011000250 HC RX REV CODE- 250: Performed by: SURGERY

## 2018-07-02 PROCEDURE — 77030035045 HC TRCR ENDOSC VRSPRT BLDLSS COVD -B: Performed by: SURGERY

## 2018-07-02 PROCEDURE — 74011250636 HC RX REV CODE- 250/636

## 2018-07-02 PROCEDURE — 77030008437 HC REINF STRP REINF SEMGD WLGO -C: Performed by: SURGERY

## 2018-07-02 PROCEDURE — 77030016151 HC PROTCTR LNS DFOG COVD -B: Performed by: SURGERY

## 2018-07-02 PROCEDURE — 77030002996 HC SUT SLK J&J -A: Performed by: SURGERY

## 2018-07-02 PROCEDURE — 77030031139 HC SUT VCRL2 J&J -A: Performed by: SURGERY

## 2018-07-02 PROCEDURE — 77030027491 HC SHR ENDOSC COVD -B: Performed by: SURGERY

## 2018-07-02 PROCEDURE — 74011250637 HC RX REV CODE- 250/637: Performed by: SURGERY

## 2018-07-02 PROCEDURE — 77030005518 HC CATH URETH FOL 2W BARD -B: Performed by: SURGERY

## 2018-07-02 PROCEDURE — 77030036598 HC CARTDRG STPL RELD ECHELON FLX J&J -D: Performed by: SURGERY

## 2018-07-02 PROCEDURE — 76210000016 HC OR PH I REC 1 TO 1.5 HR: Performed by: SURGERY

## 2018-07-02 PROCEDURE — 0D164ZA BYPASS STOMACH TO JEJUNUM, PERCUTANEOUS ENDOSCOPIC APPROACH: ICD-10-PCS | Performed by: SURGERY

## 2018-07-02 PROCEDURE — 74011000250 HC RX REV CODE- 250: Performed by: NURSE ANESTHETIST, CERTIFIED REGISTERED

## 2018-07-02 PROCEDURE — 94664 DEMO&/EVAL PT USE INHALER: CPT

## 2018-07-02 PROCEDURE — 74011250636 HC RX REV CODE- 250/636: Performed by: NURSE ANESTHETIST, CERTIFIED REGISTERED

## 2018-07-02 PROCEDURE — 77030035048 HC TRCR ENDOSC OPTCL COVD -B: Performed by: SURGERY

## 2018-07-02 PROCEDURE — 76010000172 HC OR TIME 2.5 TO 3 HR INTENSV-TIER 1: Performed by: SURGERY

## 2018-07-02 PROCEDURE — 94640 AIRWAY INHALATION TREATMENT: CPT

## 2018-07-02 PROCEDURE — 77030033639 HC SHR ENDO COAG HARM 36 J&J -E: Performed by: SURGERY

## 2018-07-02 PROCEDURE — 65270000029 HC RM PRIVATE

## 2018-07-02 PROCEDURE — 77030018831 HC SOL IRR H20 BAXT -A: Performed by: SURGERY

## 2018-07-02 PROCEDURE — 77030008477 HC STYL SATN SLP COVD -A: Performed by: ANESTHESIOLOGY

## 2018-07-02 PROCEDURE — 77030013079 HC BLNKT BAIR HGGR 3M -A: Performed by: ANESTHESIOLOGY

## 2018-07-02 PROCEDURE — 77030036732 HC RELD STPLR VASC J&J -F: Performed by: SURGERY

## 2018-07-02 PROCEDURE — 77030011640 HC PAD GRND REM COVD -A: Performed by: SURGERY

## 2018-07-02 PROCEDURE — 77030027138 HC INCENT SPIROMETER -A

## 2018-07-02 PROCEDURE — 77030002933 HC SUT MCRYL J&J -A: Performed by: SURGERY

## 2018-07-02 PROCEDURE — 77030035051: Performed by: SURGERY

## 2018-07-02 PROCEDURE — 77030032490 HC SLV COMPR SCD KNE COVD -B: Performed by: SURGERY

## 2018-07-02 PROCEDURE — 77030027876 HC STPLR ENDOSC FLX PWR J&J -G1: Performed by: SURGERY

## 2018-07-02 RX ORDER — GLYCOPYRROLATE 0.2 MG/ML
INJECTION INTRAMUSCULAR; INTRAVENOUS AS NEEDED
Status: DISCONTINUED | OUTPATIENT
Start: 2018-07-02 | End: 2018-07-02 | Stop reason: HOSPADM

## 2018-07-02 RX ORDER — ALBUTEROL SULFATE 90 UG/1
2 AEROSOL, METERED RESPIRATORY (INHALATION)
Status: DISCONTINUED | OUTPATIENT
Start: 2018-07-02 | End: 2018-07-02

## 2018-07-02 RX ORDER — DIPHENHYDRAMINE HYDROCHLORIDE 50 MG/ML
25 INJECTION, SOLUTION INTRAMUSCULAR; INTRAVENOUS
Status: DISCONTINUED | OUTPATIENT
Start: 2018-07-02 | End: 2018-07-03 | Stop reason: HOSPADM

## 2018-07-02 RX ORDER — SODIUM CHLORIDE, SODIUM LACTATE, POTASSIUM CHLORIDE, CALCIUM CHLORIDE 600; 310; 30; 20 MG/100ML; MG/100ML; MG/100ML; MG/100ML
75 INJECTION, SOLUTION INTRAVENOUS CONTINUOUS
Status: DISCONTINUED | OUTPATIENT
Start: 2018-07-03 | End: 2018-07-02 | Stop reason: HOSPADM

## 2018-07-02 RX ORDER — FENTANYL CITRATE 50 UG/ML
50 INJECTION, SOLUTION INTRAMUSCULAR; INTRAVENOUS AS NEEDED
Status: DISCONTINUED | OUTPATIENT
Start: 2018-07-02 | End: 2018-07-02 | Stop reason: HOSPADM

## 2018-07-02 RX ORDER — ALBUTEROL SULFATE 0.83 MG/ML
2.5 SOLUTION RESPIRATORY (INHALATION)
Status: DISCONTINUED | OUTPATIENT
Start: 2018-07-02 | End: 2018-07-03 | Stop reason: HOSPADM

## 2018-07-02 RX ORDER — ACETAMINOPHEN 120 MG/1
SUPPOSITORY RECTAL AS NEEDED
Status: DISCONTINUED | OUTPATIENT
Start: 2018-07-02 | End: 2018-07-03 | Stop reason: HOSPADM

## 2018-07-02 RX ORDER — ONDANSETRON 2 MG/ML
INJECTION INTRAMUSCULAR; INTRAVENOUS AS NEEDED
Status: DISCONTINUED | OUTPATIENT
Start: 2018-07-02 | End: 2018-07-02 | Stop reason: HOSPADM

## 2018-07-02 RX ORDER — MORPHINE SULFATE 1 MG/ML
2 INJECTION, SOLUTION EPIDURAL; INTRATHECAL; INTRAVENOUS
Status: DISCONTINUED | OUTPATIENT
Start: 2018-07-02 | End: 2018-07-02 | Stop reason: HOSPADM

## 2018-07-02 RX ORDER — ACETAMINOPHEN 325 MG/1
650 TABLET ORAL EVERY 4 HOURS
Status: DISCONTINUED | OUTPATIENT
Start: 2018-07-02 | End: 2018-07-03 | Stop reason: HOSPADM

## 2018-07-02 RX ORDER — DIPHENHYDRAMINE HYDROCHLORIDE 50 MG/ML
25 INJECTION, SOLUTION INTRAMUSCULAR; INTRAVENOUS
Status: DISCONTINUED | OUTPATIENT
Start: 2018-07-02 | End: 2018-07-02 | Stop reason: HOSPADM

## 2018-07-02 RX ORDER — MIDAZOLAM HYDROCHLORIDE 1 MG/ML
INJECTION, SOLUTION INTRAMUSCULAR; INTRAVENOUS AS NEEDED
Status: DISCONTINUED | OUTPATIENT
Start: 2018-07-02 | End: 2018-07-02 | Stop reason: HOSPADM

## 2018-07-02 RX ORDER — SUCCINYLCHOLINE CHLORIDE 20 MG/ML
INJECTION INTRAMUSCULAR; INTRAVENOUS AS NEEDED
Status: DISCONTINUED | OUTPATIENT
Start: 2018-07-02 | End: 2018-07-02 | Stop reason: HOSPADM

## 2018-07-02 RX ORDER — ENOXAPARIN SODIUM 100 MG/ML
40 INJECTION SUBCUTANEOUS DAILY
Status: DISCONTINUED | OUTPATIENT
Start: 2018-07-03 | End: 2018-07-03 | Stop reason: HOSPADM

## 2018-07-02 RX ORDER — LIDOCAINE HYDROCHLORIDE 20 MG/ML
INJECTION, SOLUTION EPIDURAL; INFILTRATION; INTRACAUDAL; PERINEURAL AS NEEDED
Status: DISCONTINUED | OUTPATIENT
Start: 2018-07-02 | End: 2018-07-02 | Stop reason: HOSPADM

## 2018-07-02 RX ORDER — ENOXAPARIN SODIUM 100 MG/ML
40 INJECTION SUBCUTANEOUS ONCE
Status: COMPLETED | OUTPATIENT
Start: 2018-07-02 | End: 2018-07-02

## 2018-07-02 RX ORDER — KETOROLAC TROMETHAMINE 30 MG/ML
30 INJECTION, SOLUTION INTRAMUSCULAR; INTRAVENOUS EVERY 6 HOURS
Status: COMPLETED | OUTPATIENT
Start: 2018-07-02 | End: 2018-07-03

## 2018-07-02 RX ORDER — HYOSCYAMINE SULFATE 0.12 MG/1
0.12 TABLET, ORALLY DISINTEGRATING ORAL
Status: DISCONTINUED | OUTPATIENT
Start: 2018-07-02 | End: 2018-07-03 | Stop reason: HOSPADM

## 2018-07-02 RX ORDER — SODIUM CHLORIDE, SODIUM LACTATE, POTASSIUM CHLORIDE, CALCIUM CHLORIDE 600; 310; 30; 20 MG/100ML; MG/100ML; MG/100ML; MG/100ML
150 INJECTION, SOLUTION INTRAVENOUS CONTINUOUS
Status: DISCONTINUED | OUTPATIENT
Start: 2018-07-02 | End: 2018-07-03 | Stop reason: HOSPADM

## 2018-07-02 RX ORDER — FENTANYL CITRATE 50 UG/ML
INJECTION, SOLUTION INTRAMUSCULAR; INTRAVENOUS AS NEEDED
Status: DISCONTINUED | OUTPATIENT
Start: 2018-07-02 | End: 2018-07-02 | Stop reason: HOSPADM

## 2018-07-02 RX ORDER — DEXAMETHASONE SODIUM PHOSPHATE 4 MG/ML
INJECTION, SOLUTION INTRA-ARTICULAR; INTRALESIONAL; INTRAMUSCULAR; INTRAVENOUS; SOFT TISSUE AS NEEDED
Status: DISCONTINUED | OUTPATIENT
Start: 2018-07-02 | End: 2018-07-02 | Stop reason: HOSPADM

## 2018-07-02 RX ORDER — VECURONIUM BROMIDE FOR INJECTION 1 MG/ML
INJECTION, POWDER, LYOPHILIZED, FOR SOLUTION INTRAVENOUS AS NEEDED
Status: DISCONTINUED | OUTPATIENT
Start: 2018-07-02 | End: 2018-07-02 | Stop reason: HOSPADM

## 2018-07-02 RX ORDER — PROPOFOL 10 MG/ML
INJECTION, EMULSION INTRAVENOUS AS NEEDED
Status: DISCONTINUED | OUTPATIENT
Start: 2018-07-02 | End: 2018-07-02 | Stop reason: HOSPADM

## 2018-07-02 RX ORDER — SODIUM CHLORIDE, SODIUM LACTATE, POTASSIUM CHLORIDE, CALCIUM CHLORIDE 600; 310; 30; 20 MG/100ML; MG/100ML; MG/100ML; MG/100ML
75 INJECTION, SOLUTION INTRAVENOUS CONTINUOUS
Status: DISCONTINUED | OUTPATIENT
Start: 2018-07-02 | End: 2018-07-02 | Stop reason: HOSPADM

## 2018-07-02 RX ORDER — NEOSTIGMINE METHYLSULFATE 5 MG/5 ML
SYRINGE (ML) INTRAVENOUS AS NEEDED
Status: DISCONTINUED | OUTPATIENT
Start: 2018-07-02 | End: 2018-07-02 | Stop reason: HOSPADM

## 2018-07-02 RX ORDER — ONDANSETRON 2 MG/ML
4 INJECTION INTRAMUSCULAR; INTRAVENOUS
Status: DISCONTINUED | OUTPATIENT
Start: 2018-07-02 | End: 2018-07-03

## 2018-07-02 RX ORDER — OXYCODONE HYDROCHLORIDE 5 MG/1
5 TABLET ORAL
Status: DISCONTINUED | OUTPATIENT
Start: 2018-07-02 | End: 2018-07-03 | Stop reason: HOSPADM

## 2018-07-02 RX ORDER — SCOLOPAMINE TRANSDERMAL SYSTEM 1 MG/1
PATCH, EXTENDED RELEASE TRANSDERMAL AS NEEDED
Status: DISCONTINUED | OUTPATIENT
Start: 2018-07-02 | End: 2018-07-02 | Stop reason: HOSPADM

## 2018-07-02 RX ORDER — MORPHINE SULFATE 4 MG/ML
4 INJECTION INTRAVENOUS
Status: DISCONTINUED | OUTPATIENT
Start: 2018-07-02 | End: 2018-07-03 | Stop reason: HOSPADM

## 2018-07-02 RX ORDER — HYDROCODONE BITARTRATE AND ACETAMINOPHEN 5; 325 MG/1; MG/1
1 TABLET ORAL ONCE
Status: DISCONTINUED | OUTPATIENT
Start: 2018-07-02 | End: 2018-07-02 | Stop reason: HOSPADM

## 2018-07-02 RX ADMIN — ACETAMINOPHEN 650 MG: 325 TABLET ORAL at 17:55

## 2018-07-02 RX ADMIN — CEFAZOLIN 3 G: 1 INJECTION, POWDER, FOR SOLUTION INTRAMUSCULAR; INTRAVENOUS; PARENTERAL at 07:42

## 2018-07-02 RX ADMIN — ALBUTEROL SULFATE 2.5 MG: 2.5 SOLUTION RESPIRATORY (INHALATION) at 19:40

## 2018-07-02 RX ADMIN — SUCCINYLCHOLINE CHLORIDE 100 MG: 20 INJECTION INTRAMUSCULAR; INTRAVENOUS at 07:35

## 2018-07-02 RX ADMIN — KETOROLAC TROMETHAMINE 30 MG: 30 INJECTION, SOLUTION INTRAMUSCULAR at 17:54

## 2018-07-02 RX ADMIN — ACETAMINOPHEN 650 MG: 325 TABLET ORAL at 21:49

## 2018-07-02 RX ADMIN — FAMOTIDINE 20 MG: 10 INJECTION, SOLUTION INTRAVENOUS at 06:49

## 2018-07-02 RX ADMIN — SODIUM CHLORIDE, SODIUM LACTATE, POTASSIUM CHLORIDE, AND CALCIUM CHLORIDE 75 ML/HR: 600; 310; 30; 20 INJECTION, SOLUTION INTRAVENOUS at 06:49

## 2018-07-02 RX ADMIN — VECURONIUM BROMIDE FOR INJECTION 2 MG: 1 INJECTION, POWDER, LYOPHILIZED, FOR SOLUTION INTRAVENOUS at 08:42

## 2018-07-02 RX ADMIN — MIDAZOLAM HYDROCHLORIDE 2 MG: 1 INJECTION, SOLUTION INTRAMUSCULAR; INTRAVENOUS at 07:29

## 2018-07-02 RX ADMIN — KETOROLAC TROMETHAMINE 30 MG: 30 INJECTION, SOLUTION INTRAMUSCULAR at 11:07

## 2018-07-02 RX ADMIN — SODIUM CHLORIDE, SODIUM LACTATE, POTASSIUM CHLORIDE, AND CALCIUM CHLORIDE 150 ML/HR: 600; 310; 30; 20 INJECTION, SOLUTION INTRAVENOUS at 22:37

## 2018-07-02 RX ADMIN — GLYCOPYRROLATE 0.3 MG: 0.2 INJECTION INTRAMUSCULAR; INTRAVENOUS at 09:49

## 2018-07-02 RX ADMIN — VECURONIUM BROMIDE FOR INJECTION 4 MG: 1 INJECTION, POWDER, LYOPHILIZED, FOR SOLUTION INTRAVENOUS at 07:58

## 2018-07-02 RX ADMIN — LIDOCAINE HYDROCHLORIDE 40 MG: 20 INJECTION, SOLUTION EPIDURAL; INFILTRATION; INTRACAUDAL; PERINEURAL at 07:35

## 2018-07-02 RX ADMIN — DEXAMETHASONE SODIUM PHOSPHATE 4 MG: 4 INJECTION, SOLUTION INTRA-ARTICULAR; INTRALESIONAL; INTRAMUSCULAR; INTRAVENOUS; SOFT TISSUE at 08:12

## 2018-07-02 RX ADMIN — SODIUM CHLORIDE, SODIUM LACTATE, POTASSIUM CHLORIDE, AND CALCIUM CHLORIDE 150 ML/HR: 600; 310; 30; 20 INJECTION, SOLUTION INTRAVENOUS at 15:51

## 2018-07-02 RX ADMIN — ONDANSETRON 4 MG: 2 INJECTION INTRAMUSCULAR; INTRAVENOUS at 09:28

## 2018-07-02 RX ADMIN — HYOSCYAMINE SULFATE 0.12 MG: 0.12 TABLET, ORALLY DISINTEGRATING ORAL at 13:10

## 2018-07-02 RX ADMIN — VECURONIUM BROMIDE FOR INJECTION 1 MG: 1 INJECTION, POWDER, LYOPHILIZED, FOR SOLUTION INTRAVENOUS at 09:23

## 2018-07-02 RX ADMIN — SCOLOPAMINE TRANSDERMAL SYSTEM 1 PATCH: 1 PATCH, EXTENDED RELEASE TRANSDERMAL at 07:28

## 2018-07-02 RX ADMIN — ENOXAPARIN SODIUM 40 MG: 100 INJECTION SUBCUTANEOUS at 06:50

## 2018-07-02 RX ADMIN — FENTANYL CITRATE 100 MCG: 50 INJECTION, SOLUTION INTRAMUSCULAR; INTRAVENOUS at 07:32

## 2018-07-02 RX ADMIN — Medication 3 MG: at 09:49

## 2018-07-02 RX ADMIN — Medication 2 MG: at 10:25

## 2018-07-02 RX ADMIN — PROPOFOL 10 MG: 10 INJECTION, EMULSION INTRAVENOUS at 07:35

## 2018-07-02 RX ADMIN — ACETAMINOPHEN 650 MG: 325 TABLET ORAL at 14:18

## 2018-07-02 RX ADMIN — KETOROLAC TROMETHAMINE 30 MG: 30 INJECTION, SOLUTION INTRAMUSCULAR at 23:31

## 2018-07-02 RX ADMIN — ONDANSETRON 4 MG: 2 INJECTION INTRAMUSCULAR; INTRAVENOUS at 10:33

## 2018-07-02 NOTE — PROGRESS NOTES
1914:  Bedside report received from Theron Staples, [facility]. Patient in bed.  at bedside. Complaints of gas pain 6/10. Call bell within reach. Will continue to monitor. 1952:  Patient assisted to restroom; 1 assist.    2008:  Ambulating in halls will RN. 2015:  Returned to room. Assisted to restroom. Instructed to pull the call bell with ready to return to bed. 2142:  Patient assisted to the restroom. 7133:  Up to ambulate in gómez with RN    6534:  Patient returned to bed. Tolerated ambulating well.    0728:  Assisted to restroom. Bedside shift change report given to Brandie Ko RN (oncoming nurse) by Enrico Ochoa RN (offgoing nurse). Report included the following information SBAR, OR Summary, Intake/Output, Recent Results and Med Rec Status.

## 2018-07-02 NOTE — OP NOTES
DATE: 7/2/2018    PREOPERATIVE DIAGNOSIS: Clinically severe obesity, body mass index of 38,   comorbidities of GIL. POSTOPERATIVE DIAGNOSIS: Clinically severe obesity, body mass index of 38,   comorbidities of GIL  PROCEDURES: Laparoscopic Dilcia-en-Y gastric bypass with 909 cm retrocolic   retrogastric Dilcia limb, 40 cm biliopancreatic limb, 15 ml    tubularized gastric pouch applied to the lesser curvature of stomach  SURGEON: Dr. Jelani Enciso. Hunter Beck MD, FACS   ASSISTANT: Harmeet Deleon SA  ANESTHESIA: General endotracheal anesthesia. Local anesthetic mixture 1%   lidocaine, 0.5% Marcaine, with epinephrine injected locally utilizing 50  mL. FINDINGS: None  SPECIMEN: None  IMPLANTS: * No implants in log *  ESTIMATED BLOOD LOSS: 25 ml ml  FLUIDS: 3000 ml   URINE OUTPUT: 125 ml   DRAIN: None  COMPLICATIONS: none  OPERATIVE START TIME: 0800  OPERATIVE COMPLETION TIME: 0955    DESCRIPTION OF PROCEDURE: The patient was prepped and draped in standard sterile fashion after being placed under general anesthetic. A site was selected superior to the umbilicus in the midline. This area was incised. A Hiptype 5 mm Optical trocar was placed over the laparoscope and directed into the abdominal cavity using Optiview technique. Abdomen was insufflated to 15 mmHg and surveyed. There was no evidence of injury from the entry. Additional trocars were then placed. One 5 mmm trocar was placed in the anterior axillary line left upper quadrant approximately 3 fingerbreadths below the costal margin. Another 12 mm trocar was placed in the lateral portion of the left rectus sheath approximately 3 superior to the umbilical trocar. Another 12 mm trocar was placed approximately 4 fingerbreadths superior to the umbilical trocar in the  lateral portion of right rectus sheath. All were placed after incising with scalpel, all were placed using blunt dissecting technique. There was no evidence of injury from the entries.      Instrument were placed in the abdominal cavity. The omentum and transverse colon were retracted superiorly, exposing ligament of Treitz. Small bowel was measured 40 cm distal to the ligament of Treitz, divided at this level with A 60 mm [place] stapler. One additional load was used to divide down to the base of the mesentery, confirming preservation of blood flow to the small bowel above and below the staple line prior to firing. Then, from the distal staple line, the Dilcia limb was measured 100 cm distal and an antimesenteric enterotomy was made. Another antimesenteric enterotomy was made just proximal to the proximal staple line of the biliopancreatic limb. Through these enterotomies, a 60 mm [place] stapler was placed into the bowel, clamped on the antimesenteric borders and fired to form a stapled side-to-side anastomosis. Remaining enterotomy was closed in a running inverting fashion with 3-0 Vicryl suture. The mesenteric defect was then closed with running 2-0 silk suture, extending on the bowel wall in a running Lembert fashion for second layer closure of the enterotomy. At this point, attention was directed to the transverse mesocolon. A site was selected just anterior to the ligament of Treitz. This area was incised, entering the lesser sac. The Dilcia limb was then passed through the fenestration of the transverse mesocolon and into the lesser sac taking care not to twist on its mesentery. At this point, the omentum and transverse colon were retracted inferiorly. The greater curvature of the stomach was then grasped and the gastrocolic ligament was dissected off of it directly on the wall of the stomach using the Harmonic scalpel to widely open the lesser sac. Adhesions between the posterior wall of the stomach and the retroperitoneum were taken down under direct vision to fully free the lesser sac. At this point, an incision was made near the xiphoid.  Through this incision, a Jorge Luis retractor was placed through the abdominal wall beneath the left lateral segment of the liver and connected to a bedside retraction system allowing exposure of the esophageal hiatus. The angle of His was then dissected anterior to posterior down the left fern of the diaphragm using Harmonic scalpel to assist in eventual division of the gastric pouch and distal stomach remnant. The lesser curvature of the stomach was then examined. A site was selected just proximal to the crow's foot of the vagus nerve in this area. The gastrohepatic ligament was dissected away from the lesser curvature of the stomach directly on the wall of the stomach to enter the lesser sac. This fenestration was then widened using Bovie cautery over a 10 mm articulating esophageal retractor which had been placed through the lesser sac and brought through the fenestration. Then, a 60 mm [place] stapler was placed transversely across the stomach through this fenestration, clamped and then fired to begin the formation of the gastric pouch. Another stapler was placed near the crotch of the previous staple line and directed superiorly toward the angle of His, and clamped. Prior to firing, a 34-Ghanaian orogastric Arnol tube was brought through the esophagus into the stomach so its tip was against the transverse staple line, its course lying along the lesser curvature of stomach. The stapler was snugged against it and then fired. Then, a 60 mm [place] stapler with Seam Guard reinforcement was placed in the crotch of the previous staple line and clamped and then fired from the crotch of previous staple line directed superiorly toward the angle of His until the gastric pouch was fully divided from the distal stomach remnant. Once fully divided, the staple lines were examined, noted to be hemostatic and viable. The lesser omentum was placed between the staple lines to prevent gastro-gastric fistulization.  The tip of the gastric pouch placed in the lesser sac and the distal stomach remnant was retracted anteriorly to allow exposure of the lesser sac. The German limb was examined. The proximal 6 cm were noted to be tethered by its mesentery. This proximal segment was then excised from the mesentery using a 60 mm [place] staple load and then one additional load was used to divide the devascularized segment from the remainder of the German limb. This segment was brought out of the abdominal cavity via one of the trocar sites, passed off the field and discarded. At this point, the gastrojejunostomy was begun by sewing the right antimesenteric border of the German limb to the distal portion of the gastric pouch using running suture of 3-0 Vicryl. An anterior gastrotomy and antimesenteric enterotomy were made. From the left apex of these 2 enterotomies, a 3-0 Vicryl suture was placed and run on the posterior surface in a running inverting fashion to the right apex, transitioned on the anterior surface and sewn approximately half way across the opening in an inverting fashion. Then, another 3-0 Vicryl suture was placed at the left apex and sewn to the previous suture in a running inverting fashion on the anterior surface. Prior to tying these sutures, the Arnol tube was brought across the anastomosis, then the sutures were tied, completing the inner layer. Then, from the left apex another 3-0 Vicryl suture was placed and run to the right apex in a running Lembert fashion completing the anterior outer layer os the anastomosis, thereby completing the anastomosis. Once this was complete, the Arnol tube was removed from the patient. Then, working from within the lesser sac, the German limb was sewn to the right anterior surface pf the transverse mesocolic defect with  3 interrupted sutures of 2-0 silk. Then, the left side of the german limb mesentery was closed to the left side of the tranverse mesocolic defect with a running suture of 2-0 silk.      The omentum and transverse colon were retracted superiorly. The base of the left side of the transverse mesocolic defect was exposed and this was sewn to the base of the left side of the Dilcia limb mesentery with a pursestring suture of 2-0 silk. The Dilcia limb was then retracted to the left. The right-side of the  transverse mesocolic defect was closed to the right side of the Dilcia limb mesentery with another pursestring suture of 2-0 silk, then one additional interrupted suture was placed between the right lateral surface of the Dilcia limb and the right lateral surface of the transverse mesocolic defect, completing the closure of the defect around the Dilcia limb. Once this was complete, both mesenteric defects were examined and noted to be well closed. Both anastomoses were examined and noted to be hemostatic and viable without evidence of leak. The omentum and transverse colon were retracted inferiorly back to normal position. The gastric remnant was placed over the anastomosis, returning the anastomosis into the lesser sac. The Jorge Luis retractor was removed. At this point, the abdomen was desufflated via the remaining trocars. All trocars were then removed. The trocar sites were rendered hemostatic with Bovie cautery, anesthetized with the local anesthetic mixture and then closed with interrupted 4-0 Monocryl deep dermal sutures. Dermabond was applied as wound dressings. The patient tolerated the procedure well.

## 2018-07-02 NOTE — PERIOP NOTES
1008 Pt received to PACU and connected to monitor. Bedside report given by Cata Santana RN. Vital signs stable. Nurse at bedside. Will continue to monitor. 1110 Called to update pt's family on current status and room assignment. 1120 TRANSFER - OUT REPORT:    Verbal report given to TRISTAN Saravia on [employer]  being transferred to 2200 (unit) for routine post - op       Report consisted of patients Situation, Background, Assessment and   Recommendations(SBAR). Information from the following report(s) SBAR, Kardex and MAR was reviewed with the receiving nurse. Lines:   Peripheral IV 07/02/18 Left Hand (Active)   Site Assessment Clean, dry, & intact 7/2/2018 11:03 AM   Phlebitis Assessment 0 7/2/2018 11:03 AM   Infiltration Assessment 0 7/2/2018 11:03 AM   Dressing Status Clean, dry, & intact 7/2/2018 11:03 AM   Dressing Type Transparent;Tape 7/2/2018 11:03 AM   Hub Color/Line Status Pink; Infusing 7/2/2018 11:03 AM   Action Taken Open ports on tubing capped 7/2/2018 11:03 AM   Alcohol Cap Used Yes 7/2/2018 11:03 AM        Opportunity for questions and clarification was provided.       Patient transported with:   Registered Nurse  Tech

## 2018-07-02 NOTE — PROGRESS NOTES
TRANSFER - IN REPORT:    Verbal report received from [facility] on Ana Pastor  being received from PACU for routine post - op      Report consisted of patients Situation, Background, Assessment and   Recommendations(SBAR). Information from the following report(s) SBAR and Procedure Summary was reviewed with the receiving nurse. Opportunity for questions and clarification was provided. 1130 Received pt via bed awake but drowsy and nauseated. Lap sites to abdomen c/d/i. Oriented to call bell, phone and IS with pt giving return demonstration.  at [facility].

## 2018-07-02 NOTE — PROGRESS NOTES
Chart reviewed and pt/  verified demographics. She has [employer] and Dr Vivinaa Dupont* is her pcp, seen for preop. She lives with  and her three children. She is independent with ADL. She used home health 14 yrs ago when her c section got infected. She has CPAP and a nebulizer at home. Reason for Admission:   Bariatric surgery for obesity                    RRAT Score:      11               Plan for utilizing home health:    None at this time                      Likelihood of Readmission:  Low, planned procedure                       Transition of Care Plan:    to take her home.

## 2018-07-02 NOTE — IP AVS SNAPSHOT
[address] Patient: Linda Damon MRN: [id number] :[id number] About your hospitalization You were admitted on:  2018 You last received care in the:  [address] [place] [address],[address] You were discharged on:  July 3, 2018 Why you were hospitalized Your primary diagnosis was:  Not on File Your diagnoses also included: Morbid Obesity (Hcc) Follow-up Information Follow up With Details Comments Contact Info MD Dale Santacruz  
[address] [facility] [address] 
[phone] Nicki Buckley MD In 2 weeks  [address] [facility] [address]  S[facility] SPECIALISTS [address] [id number] 
[phone] Your Scheduled Appointments 2018 10:00 AM EDT  
POST OP with Nicki Buckley MD  
[address] [place] [address]) [address] [facility] [address] [id number]  
[phone] 2018  1:30 PM EDT NUTRITION COUNSELING with [facility] DIETICIAN [address] [place] [address] ([address] [place] [address]) [id number] [place] [address] [address] [id number]  
[phone] Discharge Orders None A check maribell indicates which time of day the medication should be taken. My Medications START taking these medications Instructions Each Dose to Equal  
 Morning Noon Evening Bedtime  
 enoxaparin 40 mg/0.4 mL Commonly known as:  LOVENOX Your last dose was: Your next dose is: 0.4 mL by SubCUTAneous route daily. 40 mg  
    
   
   
   
  
 ondansetron 4 mg disintegrating tablet Commonly known as:  ZOFRAN ODT Your last dose was: Your next dose is: Take 1 Tab by mouth every six (6) hours as needed. Indications: PREVENTION OF POST-OPERATIVE NAUSEA AND VOMITING  
 4 mg oxyCODONE IR 5 mg immediate release tablet Commonly known as:  Jean Paul Sanchez Your last dose was: Your next dose is: Take 1 Tab by mouth every four (4) hours as needed (for pain not relieved by tylenol). Max Daily Amount: 30 mg.  
 5 mg CONTINUE taking these medications Instructions Each Dose to Equal  
 Morning Noon Evening Bedtime  
 albuterol 90 mcg/actuation inhaler Commonly known as:  PROVENTIL HFA, VENTOLIN HFA, PROAIR HFA Your last dose was: Your next dose is: Take  inhaled by mouth. Calcium-Cholecalciferol (D3) 600 mg(1,500mg) -400 unit Chew Your last dose was: Your next dose is: Take 1 Tab by mouth daily. 1 Tab  
    
   
   
   
  
 ergocalciferol 50,000 unit capsule Commonly known as:  ERGOCALCIFEROL Your last dose was: Your next dose is: Take 50,000 Units by mouth every seven (7) days. [address] Units  
    
   
   
   
  
 esomeprazole 20 mg capsule Commonly known as:  Lulu Calkin Your last dose was: Your next dose is: Take 20 mg by mouth daily. 20 mg  
    
   
   
   
  
 estradiol 2 mg tablet Commonly known as:  ESTRACE Your last dose was: Your next dose is: TAKE 1 TABLET BY MOUTH EVERY DAY  
     
   
   
   
  
 hydroxychloroquine 200 mg tablet Commonly known as:  PLAQUENIL Your last dose was: Your next dose is: TAKE 1 TABLET BY MOUTH TWICE A DAY WITH FOOD OR MILK  
     
   
   
   
  
 pediatric multivitamins chewable tablet Your last dose was: Your next dose is: Take 1 Tab by mouth daily. 1 Tab * VITAMIN B-12 1,000 mcg sublingual tablet Generic drug:  cyanocobalamin Your last dose was: Your next dose is: Take 1,000 mcg by mouth daily. 1000 mcg * cyanocobalamin 1,000 mcg tablet Your last dose was: Your next dose is: Take 1,000 mcg by mouth daily. 1000 mcg * Notice: This list has 2 medication(s) that are the same as other medications prescribed for you. Read the directions carefully, and ask your doctor or other care provider to review them with you. Where to Get Your Medications Information on where to get these meds will be given to you by the nurse or doctor. ! Ask your nurse or doctor about these medications  
  enoxaparin 40 mg/0.4 mL  
 ondansetron 4 mg disintegrating tablet  
 oxyCODONE IR 5 mg immediate release tablet Opioid Education Prescription Opioids: What You Need to Know: 
 
 
After general anesthesia or intravenous sedation, for 24 hours or while taking prescription Narcotics: · Limit your activities · Do not drive and operate hazardous machinery · Do not make important personal or business decisions · Do  not drink alcoholic beverages · If you have not urinated within 8 hours after discharge, please contact your surgeon on call. Report the following to your surgeon: 
· Excessive pain, swelling, redness or odor of or around the surgical area · Temperature over 100.5 · Nausea and vomiting lasting longer than 4 hours or if unable to take medications · Any signs of decreased circulation or nerve impairment to extremity: change in color, persistent  numbness, tingling, coldness or increase pain · Any questions What to do at Home: 
Recommended activity: Activity as tolerated,  
 
 *  Please give a list of your current medications to your Primary Care Provider. *  Please update this list whenever your medications are discontinued, doses are 
    changed, or new medications (including over-the-counter products) are added. *  Please carry medication information at all times in case of emergency situations. These are general instructions for a healthy lifestyle: No smoking/ No tobacco products/ Avoid exposure to second hand smoke Surgeon General's Warning:  Quitting smoking now greatly reduces serious risk to your health. Obesity, smoking, and sedentary lifestyle greatly increases your risk for illness A healthy diet, regular physical exercise & weight monitoring are important for maintaining a healthy lifestyle You may be retaining fluid if you have a history of heart failure or if you experience any of the following symptoms:  Weight gain of 3 pounds or more overnight or 5 pounds in a week, increased swelling in our hands or feet or shortness of breath while lying flat in bed. Please call your doctor as soon as you notice any of these symptoms; do not wait until your next office visit. Recognize signs and symptoms of STROKE: 
 
F-face looks uneven A-arms unable to move or move unevenly S-speech slurred or non-existent T-time-call 911 as soon as signs and symptoms begin-DO NOT go Back to bed or wait to see if you get better-TIME IS BRAIN. Warning Signs of HEART ATTACK Call 911 if you have these symptoms: 
? Chest discomfort. Most heart attacks involve discomfort in the center of the chest that lasts more than a few minutes, or that goes away and comes back. It can feel like uncomfortable pressure, squeezing, fullness, or pain. ? Discomfort in other areas of the upper body. Symptoms can include pain or discomfort in one or both arms, the back, neck, jaw, or stomach. ? Shortness of breath with or without chest discomfort. ? Other signs may include breaking out in a cold sweat, nausea, or lightheadedness. Don't wait more than five minutes to call [address]! Fast action can save your life. Calling 911 is almost always the fastest way to get lifesaving treatment. Emergency Medical Services staff can begin treatment when they arrive  up to an hour sooner than if someone gets to the hospital by car. The discharge information has been reviewed with the patient. The patient verbalized understanding. Discharge medications reviewed with the patient and appropriate educational materials and side effects teaching were provided. ___________________________________________________________________________________________________________________________________ Introducing [facility]! Magdiel Pandey introduces SureVisit patient portal. Now you can access parts of your medical record, email your doctor's office, and request medication refills online. 1. In your internet browser, go to [email or web address] 2. Click on the First Time User? Click Here link in the Sign In box. You will see the New Member Sign Up page. 3. Enter your SureVisit Access Code exactly as it appears below. You will not need to use this code after youve completed the sign-up process. If you do not sign up before the expiration date, you must request a new code. · SureVisit Access Code: [email or web address] Expires: 9/23/2018  8:57 AM 
 
4. Enter the last four digits of your Social Security Number (xxxx) and Date of Birth (mm/dd/yyyy) as indicated and click Submit. You will be taken to the next sign-up page. 5. Create a SureVisit ID. This will be your SureVisit login ID and cannot be changed, so think of one that is secure and easy to remember. 6. Create a SureVisit password. You can change your password at any time. 7. Enter your Password Reset Question and Answer. This can be used at a later time if you forget your password. 8. Enter your e-mail address. You will receive e-mail notification when new information is available in [address] 9. Click Sign Up. You can now view and download portions of your medical record. 10. Click the Download Summary menu link to download a portable copy of your medical information. If you have questions, please visit the Frequently Asked Questions section of the Cle[employer] website. Remember, Estimize is NOT to be used for urgent needs. For medical emergencies, dial 911. Now available from your iPhone and Android! Introducing Otis Barakat As a [facility] patient, I wanted to make you aware of our electronic visit tool called Otis Barakat. [facility] 24/7 allows you to connect within minutes with a medical provider 24 hours a day, seven days a week via a mobile device or tablet or logging into a secure website from your computer. You can access Otis Barakat from anywhere in the [place]. A virtual visit might be right for you when you have a simple condition and feel like you just dont want to get out of bed, or cant get away from work for an appointment, when your regular [facility] provider is not available (evenings, weekends or holidays), or when youre out of town and need minor care. Electronic visits cost only $49 and if the [facility] 24/7 provider determines a prescription is needed to treat your condition, one can be electronically transmitted to a nearby pharmacy*. Please take a moment to enroll today if you have not already done so. The enrollment process is free and takes just a few minutes. To enroll, please download the [facility] 24/7 agnelica to your tablet or phone, or visit [email or web address] to enroll on your computer.    
And, as an [address] [place] [address] patient with a Ameri-tech 3D account, the results of your visits will be scanned into your electronic medical record and your primary care provider will be able to view the scanned results. We urge you to continue to see your regular [facility] provider for your ongoing medical care. And while your primary care provider may not be the one available when you seek a Beetle Beatshalfin virtual visit, the peace of mind you get from getting a real diagnosis real time can be priceless. For more information on KIT digital, view our Frequently Asked Questions (FAQs) at [email or web address]. Sincerely, 
 
Kathy Jackson MD 
Chief Medical Officer Sonny Min *:  certain medications cannot be prescribed via KIT digital Providers Seen During Your Hospitalization Provider Specialty Primary office phone Liliam Murillo MD General Surgery [phone] Your Primary Care Physician (PCP) Primary Care Physician Office Phone Office Fax Tc Adriana [phone] You are allergic to the following Allergen Reactions Promethazine Unknown (comments) Other reaction(s): psychological reaction Hallucinations Recent Documentation Height Weight BMI OB Status Smoking Status 1.575 m 93.9 kg 37.86 kg/m2 Hysterectomy Never Smoker Emergency Contacts Name Discharge Info Relation Home Work Mobile 5 [facility] CAREGIVER [3] Spouse [3] [phone] Patient Belongings The following personal items are in your possession at time of discharge: 
  Dental Appliances: None  Visual Aid: None      Home Medications: None   Jewelry: None  Clothing: Shirt, Socks, Undergarments, Footwear, Pants    Other Valuables: None Discharge Instructions Attachments/References GASTRIC BYPASS: LAPAROSCOPIC TRI-EN-Y: POST-OP (ENGLISH) ENOXAPARIN (LOVENOX) (ENGLISH) OXYCODONE, RAPID RELEASE (BY MOUTH) (ENGLISH) Patient Handouts Laparoscopic Tri-en-Y Gastric Bypass: What to Expect at Home Your Recovery A Dilcia-en-Y (say \"benigno-en-why\") gastric bypass is surgery to make the stomach smaller and change the connection between the stomach and the intestines. It is done to help people lose weight. The surgery limits the amount of food the stomach can hold. This helps you eat less and feel full sooner. The cuts (incisions) the doctor made in your belly will probably be sore for several days after the surgery. If you have stitches, the doctor will take these out at your follow-up visit. You probably will lose weight very quickly in the first few months after surgery. As time goes on, your weight loss will slow down. You can expect most of your weight loss to happen in the first 12 months after your surgery. You will have regular doctor's appointments during this time to check how you are doing. It is important to think of this surgery as a tool to help you lose weight. It is not an instant fix. You will still need to eat a healthy diet and get regular exercise. This will help you reach your weight goal and avoid regaining the weight you lose. It is common to have many different emotions after this surgery. You may feel happy or excited as you begin to lose weight. But you may also feel overwhelmed or frustrated by the changes that you have to make in your diet, activity, and lifestyle. Talk with your doctor if you have concerns or questions. This care sheet gives you a general idea about how long it will take for you to recover. But each person recovers at a different pace. Follow the steps below to get better as quickly as possible. How can you care for yourself at home? Activity ? · Rest when you feel tired. Getting enough sleep will help you recover. ? · Try to walk each day. Start by walking a little more than you did the day before. Bit by bit, increase the amount you walk. Walking boosts blood flow and helps prevent pneumonia and constipation. ? · Avoid strenuous activities, such as bicycle riding, jogging, weight lifting, or aerobic exercise, until your doctor says it is okay. ? · Until your doctor says it is okay, avoid lifting anything that would make you strain. This may include a child, heavy grocery bags and milk containers, a heavy briefcase or backpack, cat litter or dog food bags, or a vacuum . ? · Hold a pillow over your incisions when you cough or take deep breaths. This will support your belly and decrease your pain. ? · Do breathing exercises at home as instructed by your doctor. This will help prevent pneumonia. ? · Ask your doctor when you can drive again. ? · You will probably need to take 2 to 4 weeks off from work. It depends on the type of work you do and how you feel. You will probably return to normal activities within 3 to 5 weeks. ? · You may shower, if your doctor okays it. Pat the incisions dry. Do not take a bath for the first 2 weeks, or until your doctor tells you it is okay. ? · Ask your doctor when it is okay for you to have sex. Diet ? · Your doctor will give you specific instructions about what to eat after the surgery. For the first 2 to 6 weeks, you will need to follow a liquid or soft diet. Bit by bit, you will be able to add solid foods back into your diet. ? · Your doctor may recommend that you work with a dietitian to plan healthy meals that give you enough protein, vitamins, and minerals while you are losing weight. Even with a healthy diet, you probably will need to take vitamin and mineral supplements for the rest of your life. ? · At first you may feel full after just a few sips of water or other liquid. It is important to try to sip water throughout the day to avoid becoming dehydrated. ·  
? · You may notice that your bowel movements are not regular right after your surgery. This is common. Try to avoid constipation and straining with bowel movements. ? · Sometimes the stomach empties food into the small intestine too quickly. This is called dumping syndrome. It can cause diarrhea and make you feel faint, shaky, and nauseated. It also can make it hard for your body to get enough nutrition. ¨ High-sugar foods-such as desserts, soda pop, and fruit juices-are most likely to cause dumping syndrome. Avoid high-sugar foods, or use products that have artificial sweeteners if sugar gives you a problem. ¨ Do not drink liquids within a half hour before eating and up to an hour after eating. Liquids move food even more quickly into the small intestine. Quick emptying of the stomach increases the chance of diarrhea. ¨ Eat slowly. Try to chew each bite about 20 times. Allow 20 to 30 minutes for each meal. 
¨ Eat 5 or 6 small meals or snacks a day. This may keep you from feeling too full after eating and may reduce problems with diarrhea and dumping syndrome. Medicines ? · Your doctor will tell you if and when you can restart your medicines. He or she will also give you instructions about taking any new medicines. ? · If you take blood thinners, such as warfarin (Coumadin), clopidogrel (Plavix), or aspirin, be sure to talk to your doctor. He or she will tell you if and when to start taking those medicines again. Make sure that you understand exactly what your doctor wants you to do. ? · Be safe with medicines. Take pain medicines exactly as directed. ¨ If the doctor gave you a prescription medicine for pain, take it as prescribed. ¨ If you are not taking a prescription pain medicine, ask your doctor if you can take an over-the-counter medicine. ? · If you think your pain medicine is making you sick to your stomach: 
¨ Take your medicine after meals (unless your doctor has told you not to). ¨ Ask your doctor for a different pain medicine. ? · If your doctor prescribed antibiotics, take them as directed.  Do not stop taking them just because you feel better. You need to take the full course of antibiotics. Incision care ? · If you have strips of tape on the incisions, leave the tape on for a week or until it falls off.  
? slow healing. You may cover the area with a gauze bandage if it weeps or rubs against clothing. Change the bandage every day. ? · Keep the area clean and dry. Follow-up care is a key part of your treatment and safety. Be sure to make and go to all appointments, and call your doctor if you are having problems. It's also a good idea to know your test results and keep a list of the medicines you take. When should you call for help? Call 911 anytime you think you may need emergency care. For example, call if: 
? · You passed out (lost consciousness). ? · You are short of breath. ?Call your doctor now or seek immediate medical care if: 
? · You have pain that does not get better after you take pain medicine. ? · You cannot pass stool or gas. ? · You are sick to your stomach and cannot drink fluids. ? · You have loose stitches, or your incision comes open. ? · You have signs of a blood clot, such as: 
¨ Pain in your calf, back of the knee, thigh, or groin. ¨ Redness and swelling in your leg or groin. ? · You have signs of infection, such as: 
¨ Increased pain, swelling, warmth, or redness. ¨ Red streaks leading from the incision. ¨ Pus draining from the incision. ¨ A fever. ? Watch closely for changes in your health, and be sure to contact your doctor if you have any problems. Where can you learn more? Go to http://amrita-moira.info/. Enter Y354 in the search box to learn more about \"Laparoscopic Dilcia-en-Y Gastric Bypass: What to Expect at Home. \" Current as of: October 13, 2016 Content Version: 11.4 © 2835-0654 Healthwise, Incorporated.  Care instructions adapted under license by [employer] (which disclaims liability or warranty for this information). If you have questions about a medical condition or this instruction, always ask your healthcare professional. [address] any warranty or liability for your use of this information. Enoxaparin (Lovenox): Care Instructions Your Care Instructions Enoxaparin (Lovenox) is an anticoagulant medicine. It is one of a class of anticoagulants called low molecular weight heparin. Many people call these medicines blood thinners. They don't actually thin the blood, but they increase the time it takes a blood clot to form. This reduces the chance of a blood clot in the leg veins (deep vein thrombosis) or in the lungs (pulmonary embolism). Enoxaparin is a shot (injection). You or someone caring for you will inject it once or twice a day. Most people need shots for 5 to 10 days, but in some cases it can be longer. Your doctor will tell you how long you need to have the shots. Enoxaparin is used to: · Treat deep vein thrombosis (DVT), which is a blood clot in the legs, pelvis, or arms. · Reduce the chance of getting blood clots after certain surgeries. For example, you may take enoxaparin after knee or hip replacement surgery. · Reduce the chance of getting blood clots in people who are likely to get them and who are not active for a long period of time. For example, you may need enoxaparin if you need to stay in bed for a long time because of a health problem. · Reduce the chance of blood clots when another blood thinner is stopped for a short time. For example, if you take warfarin and need surgery, your doctor may ask you to stop taking warfarin for a short time before the surgery. You will take enoxaparin to help prevent blood clots before the surgery. After the surgery, your doctor will tell you when it is safe to start taking warfarin again. This is called bridge therapy. Follow-up care is a key part of your treatment and safety.  Be sure to make and go to all appointments, and call your doctor if you are having problems. It's also a good idea to know your test results and keep a list of the medicines you take. How can you care for yourself at home? How to inject enoxaparin You will get a prescription for prefilled syringes. Inject the medicine at the same time every day unless your doctor gives you other instructions. 1. Wash and dry your hands. 2. Sit or lie in a position that lets you see your belly. 3. Clean the injection site with an alcohol pad or swab, and let it dry. Choose a site on the right or left side of your belly, at least 2 inches from your belly button. Change the site each time you inject the medicine. 4. Remove the needle cap by pulling it straight off. Don't twist it. 5. Hold the syringe like a pencil in one hand. With the other hand, pinch an area of the injection site skin. You should have a \"fold\" in the skin. 6. Insert the entire needle straight down into the fold of skin. Don't insert the needle at an angle. 7. Press the plunger with your thumb until the syringe is empty. 8. Pull the needle straight out and let go of the skin. 9. Point the needle away from you and press down on the plunger. The needle will be covered. Take precautions · Don't rub the injection site. This could cause bruising. · Don't push air bubbles out of the syringe unless your doctor tells you to. Each syringe comes with air bubbles. · Don't stop taking enoxaparin without talking to your doctor. · If you are taking a blood thinner, be sure you get instructions about how to take your medicine safely. Blood thinners can cause serious bleeding problems. · Talk to your doctor before you take any prescription medicines, over-the-counter medicines, antibiotics, vitamins, or herbal products. · Don't take the following medicines unless your doctor says it's okay: ¨ Aspirin, products like aspirin (salicylates), or products that contain aspirin ¨ Nonsteroidal anti-inflammatory drugs (NSAIDs), such as ibuprofen (Advil, Motrin) and naproxen (Aleve) · Store enoxaparin at room temperature. Don't put it in the refrigerator or freezer. When should you call for help? Call 911 anytime you think you may need emergency care. For example, call if: 
? · You passed out (lost consciousness). ? · You have signs of severe bleeding, such as: ¨ A severe headache that is different from past headaches. ¨ Vomiting blood or what looks like coffee grounds. ¨ Passing maroon or very bloody stools. ?Call your doctor now or seek immediate medical care if: 
? · You have unexpected bleeding, including: ¨ Blood in stools or black stools that look like tar. ¨ Blood in your urine. ¨ Bruises or blood spots under the skin. ? · You feel dizzy or lightheaded. ? Watch closely for changes in your health, and be sure to contact your doctor if: 
? · You do not get better as expected. Where can you learn more? Go to http://amrita-moira.info/. Enter P266 in the search box to learn more about \"Enoxaparin (Lovenox): Care Instructions. \" Current as of: September 21, 2016 Content Version: 11.4 © 0678-7021 [employer]. Care instructions adapted under license by [employer] (which disclaims liability or warranty for this information). If you have questions about a medical condition or this instruction, always ask your healthcare professional. [place] [address] any warranty or liability for your use of this information. Oxycodone, Rapid Release (By mouth) Oxycodone Hydrochloride (nb-m-NHP-done prashanth-droe-KLOR-maryse) Treats moderate to severe pain. This medicine is a narcotic pain reliever. Brand Name(s): Oxaydo, Oxy IR, Roxicodone There may be other brand names for this medicine. When This Medicine Should Not Be Used: This medicine is not right for everyone.  Do not use it if you had an allergic reaction to oxycodone, codeine, hydrocodone, dihydrocodeine, or morphine, or you have a stomach or bowel blockage. How to Use This Medicine:  
Capsule, Liquid, Tablet · Take your medicine as directed. Your dose may need to be changed several times to find what works best for you. · An overdose can be dangerous. Follow directions carefully so you do not get too much medicine at one time. · Oral liquid: Measure the oral liquid medicine with a marked measuring spoon, oral syringe, or medicine cup. · Oxaydo® tablet: Swallow it whole with enough water to swallow it completely. Do not break, crush, chew, or dissolve it. Do not wet the tablet before you put it in your mouth. · This medicine should come with a Medication Guide. Ask your pharmacist for a copy if you do not have one. · Missed dose: Take a dose as soon as you remember. If it is almost time for your next dose, wait until then and take a regular dose. Do not take extra medicine to make up for a missed dose. · Store the medicine in a closed container at room temperature, away from heat, moisture, and direct light. Store the medicine in a secure place to prevent others from getting it. Ask your pharmacist about the best way to dispose of medicine you do not use. Drugs and Foods to Avoid: Ask your doctor or pharmacist before using any other medicine, including over-the-counter medicines, vitamins, and herbal products. · Do not use this medicine if you are using or have used an MAO inhibitor within the past 14 days. · Some medicines can affect how oxycodone works. Tell your doctor if you are using any of the following: ¨ Amiodarone, carbamazepine, erythromycin, ketoconazole, phenytoin, quinidine, rifampin, ritonavir ¨ Diuretic (water pill) ¨ Medicine to treat depression or anxiety ¨ Medicine to treat migraine headaches ¨ Phenothiazine medicine · Tell your doctor if you use anything else that makes you sleepy.  Some examples are allergy medicine, narcotic pain medicine, and alcohol. Tell your doctor if you are using buprenorphine, butorphanol, nalbuphine, pentazocine, or a muscle relaxer. · Do not drink alcohol while you are using this medicine. Warnings While Using This Medicine: · Tell your doctor if you are pregnant or breastfeeding, or if you have kidney disease, liver disease, heart disease, low blood pressure, lung disease or breathing problems (such as asthma, COPD), scoliosis, an enlarged prostate or trouble urinating, an underactive thyroid, [place] disease, gallbladder or pancreas problems, or digestion problems. Tell your doctor if you have a history of head injury, brain tumor, mental health problems, seizures, or alcohol or drug addiction. · This medicine may cause the following problems: 
¨ High risk of overdose, which can lead to death ¨ Respiratory depression (serious breathing problem that can be life-threatening) ¨ Serotonin syndrome, when used with certain medicines · This medicine may make you dizzy, drowsy, or faint. Do not drive or do anything else that could be dangerous until you know how this medicine affects you. Sit or lie down if you feel dizzy. Stand up carefully. · This medicine can be habit-forming. Do not use more than your prescribed dose. Call your doctor if you think your medicine is not working. · Do not stop using this medicine suddenly. Your doctor will need to slowly decrease your dose before you stop it completely. · This medicine may cause constipation, especially with long-term use. Ask your doctor if you should use a laxative to prevent and treat constipation. Drink plenty of liquids to help avoid constipation. · This medicine could cause infertility. Talk with your doctor before using this medicine if you plan to have children. · Keep all medicine out of the reach of children. Never share your medicine with anyone. Possible Side Effects While Using This Medicine: Call your doctor right away if you notice any of these side effects: · Allergic reaction: Itching or hives, swelling in your face or hands, swelling or tingling in your mouth or throat, chest tightness, trouble breathing · Anxiety, restlessness, fast heartbeat, fever, sweating, muscle spasms, twitching, nausea, vomiting, diarrhea, seeing or hearing things that are not there · Blue lips, fingernails, or skin, trouble breathing · Extreme dizziness or weakness, shallow breathing, slow heartbeat, sweating, cold or clammy skin, seizures · Lightheadedness, dizziness, fainting · Severe constipation, stomach pain If you notice these less serious side effects, talk with your doctor: · Mild constipation · Sleepiness, tiredness If you notice other side effects that you think are caused by this medicine, tell your doctor. Call your doctor for medical advice about side effects. You may report side effects to FDA at 9-627-FDA-5529 © 2017 [facility] Information is for End User's use only and may not be sold, redistributed or otherwise used for commercial purposes. The above information is an  only. It is not intended as medical advice for individual conditions or treatments. Talk to your doctor, nurse or pharmacist before following any medical regimen to see if it is safe and effective for you. Please provide this summary of care documentation to your next provider. Signatures-by signing, you are acknowledging that this After Visit Summary has been reviewed with you and you have received a copy. Patient Signature:  ____________________________________________________________ Date:  ____________________________________________________________  
  
Colton Howell Provider Signature:  ____________________________________________________________ Date:  ____________________________________________________________

## 2018-07-02 NOTE — IP AVS SNAPSHOT
[address] Patient: Beck Staff MRN: [id number] :1975 A check maribell indicates which time of day the medication should be taken. My Medications START taking these medications Instructions Each Dose to Equal  
 Morning Noon Evening Bedtime  
 enoxaparin 40 mg/0.4 mL Commonly known as:  LOVENOX Your last dose was: Your next dose is: 0.4 mL by SubCUTAneous route daily. 40 mg  
    
   
   
   
  
 ondansetron 4 mg disintegrating tablet Commonly known as:  ZOFRAN ODT Your last dose was: Your next dose is: Take 1 Tab by mouth every six (6) hours as needed. Indications: PREVENTION OF POST-OPERATIVE NAUSEA AND VOMITING  
 4 mg  
    
   
   
   
  
 oxyCODONE IR 5 mg immediate release tablet Commonly known as:  Fadi Lund Your last dose was: Your next dose is: Take 1 Tab by mouth every four (4) hours as needed (for pain not relieved by tylenol). Max Daily Amount: 30 mg.  
 5 mg CONTINUE taking these medications Instructions Each Dose to Equal  
 Morning Noon Evening Bedtime  
 albuterol 90 mcg/actuation inhaler Commonly known as:  PROVENTIL HFA, VENTOLIN HFA, PROAIR HFA Your last dose was: Your next dose is: Take  inhaled by mouth. Calcium-Cholecalciferol (D3) 600 mg(1,500mg) -400 unit Chew Your last dose was: Your next dose is: Take 1 Tab by mouth daily. 1 Tab  
    
   
   
   
  
 ergocalciferol 50,000 unit capsule Commonly known as:  ERGOCALCIFEROL Your last dose was: Your next dose is: Take 50,000 Units by mouth every seven (7) days. [address] Units  
    
   
   
   
  
 esomeprazole 20 mg capsule Commonly known as:  Melina Dame Your last dose was: Your next dose is: Take 20 mg by mouth daily. 20 mg  
    
   
   
   
  
 estradiol 2 mg tablet Commonly known as:  ESTRACE Your last dose was: Your next dose is: TAKE 1 TABLET BY MOUTH EVERY DAY  
     
   
   
   
  
 hydroxychloroquine 200 mg tablet Commonly known as:  PLAQUENIL Your last dose was: Your next dose is: TAKE 1 TABLET BY MOUTH TWICE A DAY WITH FOOD OR MILK  
     
   
   
   
  
 pediatric multivitamins chewable tablet Your last dose was: Your next dose is: Take 1 Tab by mouth daily. 1 Tab * VITAMIN B-12 1,000 mcg sublingual tablet Generic drug:  cyanocobalamin Your last dose was: Your next dose is: Take 1,000 mcg by mouth daily. 1000 mcg * cyanocobalamin 1,000 mcg tablet Your last dose was: Your next dose is: Take 1,000 mcg by mouth daily. 1000 mcg * Notice: This list has 2 medication(s) that are the same as other medications prescribed for you. Read the directions carefully, and ask your doctor or other care provider to review them with you. Where to Get Your Medications Information on where to get these meds will be given to you by the nurse or doctor. ! Ask your nurse or doctor about these medications  
  enoxaparin 40 mg/0.4 mL  
 ondansetron 4 mg disintegrating tablet  
 oxyCODONE IR 5 mg immediate release tablet

## 2018-07-02 NOTE — PERIOP NOTES
Called out to family waiting. Spoke with pt , Krista John. Updated on progress of procedure and pt status.

## 2018-07-02 NOTE — H&P
Preop History and Physical Exam:   Lesley Wyatt is a 41 y.o. female who comes into the office today after completing the entirety of the bariatric preoperative protocol satisfactorily. she initially identified obesity at the age of 27 and at age 27 weighed 90lbs. she has tried a variety of unsupervised weight-loss attempts, but has yet to meet with lasting success.   It is due to their severe obesity, which is further complicated by obstructive sleep apnea - CPAP  that the patient is now seeking out bariatric surgery, specifically, the gastric bypass             Past Medical History:   Diagnosis Date    Arthritis      Autoimmune disease ([facility] [address]       Rheumatoid arthrtis    Sleep apnea                 Past Surgical History:   Procedure Laterality Date    HX APPENDECTOMY       HX  SECTION        HX CHOLECYSTECTOMY        HX HYSTERECTOMY                   Current Outpatient Prescriptions   Medication Sig Dispense Refill    albuterol (PROVENTIL HFA, VENTOLIN HFA, PROAIR HFA) 90 mcg/actuation inhaler Take  inhaled by mouth.        ergocalciferol (ERGOCALCIFEROL) 50,000 unit capsule 50,000 Units.        ESOMEPRAZOLE MAGNESIUM (NEXIUM PO) Take  by Mouth.        hydroxychloroquine (PLAQUENIL) 200 mg tablet TAKE 1 TABLET BY MOUTH TWICE A DAY WITH FOOD OR MILK   1    estradiol (ESTRACE) 2 mg tablet TAKE 1 TABLET BY MOUTH EVERY DAY   5               Allergies   Allergen Reactions    Promethazine Unknown (comments)       Other reaction(s): psychological reaction  Hallucinations              Social History   Substance Use Topics    Smoking status: Never Smoker    Smokeless tobacco: Never Used    Alcohol use No               Family History   Problem Relation Age of Onset    Diabetes Mother      Arthritis-osteo Mother      Hypertension Mother      Diabetes Father      COPD Father           Review of Systems:  Positive in BOLD - no change from last month     CONST: Fever, weight loss, fatigue or chills  GI: Nausea, vomiting, abdominal pain, change in bowel habits, hematochezia, melena, and GERD and [place]'s esophagus   INTEG: Dermatitis, abnormal moles  HEENT: Recent changes in vision, vertigo, epistaxis, dysphagia and hoarseness  CV: Chest pain, palpitations, HTN, edema and varicosities  RESP: Cough, shortness of breath, wheezing, hemoptysis, snoring (she is now wearing her CPAP) and reactive airway disease  : Hematuria, dysuria, frequency, urgency, nocturia and stress urinary incontinence   MS: Weakness, joint pain and arthritis - improved  ENDO: Diabetes, thyroid disease, polyuria, polydipsia, polyphagia, poor wound healing, heat intolerance, cold intolerance  LYMPH/HEME: Anemia, bruising and history of blood transfusions  NEURO: Dizziness, headache, fainting, seizures and stroke  PSYCH: Anxiety and depression     Physical Exam     Visit Vitals    /47 (BP 1 Location: Left arm, BP Patient Position: At rest)    Pulse 66    Temp 98.4 °F (36.9 °C)    Resp 16    Ht 5' 2\" (1.575 m)    Wt 93.9 kg (207 lb)    SpO2 100%    BMI 37.86 kg/m2        General: 41 y.o.) female in no acute distress. Morbidly obese in abdomen and hips - gynecoid pattern  HEENT: Normocephalic, atraumatic, Pupils equal and reactive, nasopharynx clear, oropharynx clear and moist without lesions  NECK: Supple, no lymphadenopathy, thyromegaly, carotid bruits or jugular venous distension. trachea midline  RESP: Clear to auscultation bilaterally, no wheezes, rhonchi, or rales, normal respiratory excursion  CV: Regular rate and rhythm, no murmurs, rubs or gallops. 3+/4 pulses in bilateral dorsalis pedis and posterior tibialis. No distal edema or varicosities. ABD: Soft, nontender, nondistended, normoactive bowel sounds, no hernias, no hepatosplenomegaly, easily palpable costal margins, gynecoid distribution healed pfannenstiel and lap scars.    Extremities: Warm, well perfused, no tenderness or swelling, normal gait/station  Neuro: Sensation and strength grossly intact and symmetrical  Psych: Alert and oriented to person, place, and time.        Studies:     LABS: within normal limits except for mild hypovit D  EKG: without significant abnormalities  NUTRITIONAL EVALUATION has deemed her a good candidate for weight loss surgery. She has lost a couple of pounds during the [address] has deemed her a good candidate for weight loss surgery     Impression:     Clare Richardson is a 41 y.o. female who is suffering from morbid obesity and comorbidities including obstructive sleep apnea - CPAP who would benefit from bariatric surgery. We've discussed the restrictive and malabsorptive nature of the gastric bypass. The patient understands the likelihood of losing approximately 80% of their excess weight in 12 to 18 months. She also understands the risks including but not limited to bleeding, infection, need for reoperation, anastomotic ulcers, leaks and strictures, bowel obstruction secondary to adhesions and internal hernias, DVT, PE, heart attack, stroke, and death. Patient also understands risks of inadequate weight loss, excess weight loss, vitamin insufficiency, protein malnutrition, excess skin, and loss of hair. We have reviewed the components of a successful postoperative course including requirement for a high protein, low carbohydrate diet, 60 oz a day of zero calorie liquids, daily vitamin supplementation, daily exercise, regular follow-up, and participation in support groups. We have reviewed the preoperative liver shrinking clear liquid diet, as well as reviewed any medication changes required while on the clear liquid diet. In addition, the patient understands that all medications to be taken during the first 8 weeks postoperatively are to be crushed and must therefore be in immediate release formulations.   They further understand that it is their responsibility to review these and verify with their primary care doctor and pharmacist that all medications are crushable. We will schedule them for laparoscopic gastric bypass today.

## 2018-07-02 NOTE — ANESTHESIA POSTPROCEDURE EVALUATION
Post-Anesthesia Evaluation and Assessment    Patient: Clare Richardson MRN: [id number]  SSN: [id number]    YOB: 1975  Age: 41 y.o. Sex: female       Cardiovascular Function/Vital Signs  Visit Vitals    /70 (BP 1 Location: Right arm, BP Patient Position: At rest;Head of bed elevated (Comment degrees))    Pulse (!) 52    Temp 36.2 °C (97.1 °F)    Resp 12    Ht 5' 2\" (1.575 m)    Wt 93.9 kg (207 lb)    SpO2 95%    BMI 37.86 kg/m2       Patient is status post general anesthesia for Procedure(s):   LAPAROSCOPIC german-en-y GASTRIC BYPASS. Nausea/Vomiting: None    Postoperative hydration reviewed and adequate. Pain:  Pain Scale 1: Visual (07/02/18 1056)  Pain Intensity 1: 0 (07/02/18 1056)   Managed    Neurological Status:   Neuro (WDL): Within Defined Limits (07/02/18 1053)   At baseline    Mental Status and Level of Consciousness: Arousable    Pulmonary Status:   O2 Device: Room air (07/02/18 1053)   Adequate oxygenation and airway patent    Complications related to anesthesia: None    Post-anesthesia assessment completed.  No concerns    Signed By: Aura Helms MD     July 2, 2018

## 2018-07-02 NOTE — ANESTHESIA PREPROCEDURE EVALUATION
Anesthetic History   No history of anesthetic complications  PONV          Review of Systems / Medical History  Patient summary reviewed and pertinent labs reviewed    Pulmonary  Within defined limits      Sleep apnea: CPAP    Asthma        Neuro/Psych   Within defined limits           Cardiovascular                  Exercise tolerance: >4 METS     GI/Hepatic/Renal  Within defined limits   GERD: poorly controlled           Endo/Other  Within defined limits      Morbid obesity and arthritis     Other Findings   Comments: Documentation of current medication  Current medications obtained, documented and obtained? YES      Risk Factors for Postoperative nausea/vomiting:       History of postoperative nausea/vomiting? NO       Female? YES       Motion sickness? NO       Intended opioid administration for postoperative analgesia? YES      Smoking Abstinence:  Current Smoker? NO  Elective Surgery? YES  Seen preoperatively by anesthesiologist or proxy prior to day of surgery? YES  Pt abstained from smoking 24 hours prior to anesthesia?  YES    Preventive care/screening for High Blood Pressure:  Aged 18 years and older: YES  Screened for high blood pressure: YES  Patients with high blood pressure referred to primary care provider   for BP management: YES                     Physical Exam    Airway  Mallampati: II  TM Distance: 4 - 6 cm  Neck ROM: normal range of motion   Mouth opening: Diminished (comment)    Comments: Denies tmj dysfunction, but does not have full opening of jaw Cardiovascular    Rhythm: regular  Rate: normal         Dental  No notable dental hx       Pulmonary  Breath sounds clear to auscultation               Abdominal  GI exam deferred       Other Findings            Anesthetic Plan    ASA: 3  Anesthesia type: general          Induction: Intravenous  Anesthetic plan and risks discussed with: Patient

## 2018-07-02 NOTE — PROGRESS NOTES
conducted a pre-surgery visit with Daxa Johnston, who is a 41 y.o.,female. The  provided the following Interventions:  Initiated a relationship of care and support. Offered prayer and assurance of continued prayers on patient's behalf. Plan:  Chaplains will continue to follow and will provide pastoral care on an as needed/requested basis.  recommends bedside caregivers page  on duty if patient shows signs of acute spiritual or emotional distress.     [facility]   Staff [address] [facility]   [phone]

## 2018-07-03 VITALS
HEIGHT: 62 IN | BODY MASS INDEX: 38.09 KG/M2 | DIASTOLIC BLOOD PRESSURE: 77 MMHG | TEMPERATURE: 98 F | OXYGEN SATURATION: 99 % | HEART RATE: 53 BPM | RESPIRATION RATE: 18 BRPM | SYSTOLIC BLOOD PRESSURE: 126 MMHG | WEIGHT: 207 LBS

## 2018-07-03 LAB
ANION GAP SERPL CALC-SCNC: 8 MMOL/L (ref 3–18)
BUN SERPL-MCNC: 7 MG/DL (ref 7–18)
BUN/CREAT SERPL: 16 (ref 12–20)
CALCIUM SERPL-MCNC: 8.5 MG/DL (ref 8.5–10.1)
CHLORIDE SERPL-SCNC: 103 MMOL/L (ref 100–108)
CO2 SERPL-SCNC: 27 MMOL/L (ref 21–32)
CREAT SERPL-MCNC: 0.45 MG/DL (ref 0.6–1.3)
ERYTHROCYTE [DISTWIDTH] IN BLOOD BY AUTOMATED COUNT: 12.7 % (ref 11.6–14.5)
GLUCOSE SERPL-MCNC: 77 MG/DL (ref 74–99)
HCT VFR BLD AUTO: 38.4 % (ref 35–45)
HGB BLD-MCNC: 12.7 G/DL (ref 12–16)
MCH RBC QN AUTO: 28.3 PG (ref 24–34)
MCHC RBC AUTO-ENTMCNC: 33.1 G/DL (ref 31–37)
MCV RBC AUTO: 85.5 FL (ref 74–97)
PLATELET # BLD AUTO: 249 K/UL (ref 135–420)
PMV BLD AUTO: 10.5 FL (ref 9.2–11.8)
POTASSIUM SERPL-SCNC: 3.8 MMOL/L (ref 3.5–5.5)
RBC # BLD AUTO: 4.49 M/UL (ref 4.2–5.3)
SODIUM SERPL-SCNC: 138 MMOL/L (ref 136–145)
WBC # BLD AUTO: 12 K/UL (ref 4.6–13.2)

## 2018-07-03 PROCEDURE — 94760 N-INVAS EAR/PLS OXIMETRY 1: CPT

## 2018-07-03 PROCEDURE — 80048 BASIC METABOLIC PNL TOTAL CA: CPT | Performed by: SURGERY

## 2018-07-03 PROCEDURE — C9113 INJ PANTOPRAZOLE SODIUM, VIA: HCPCS | Performed by: SURGERY

## 2018-07-03 PROCEDURE — 85027 COMPLETE CBC AUTOMATED: CPT | Performed by: SURGERY

## 2018-07-03 PROCEDURE — 36415 COLL VENOUS BLD VENIPUNCTURE: CPT | Performed by: SURGERY

## 2018-07-03 PROCEDURE — 74011000250 HC RX REV CODE- 250: Performed by: SURGERY

## 2018-07-03 PROCEDURE — 74011250636 HC RX REV CODE- 250/636: Performed by: SURGERY

## 2018-07-03 PROCEDURE — 74011250637 HC RX REV CODE- 250/637: Performed by: SURGERY

## 2018-07-03 PROCEDURE — 94640 AIRWAY INHALATION TREATMENT: CPT

## 2018-07-03 RX ORDER — ENOXAPARIN SODIUM 100 MG/ML
40 INJECTION SUBCUTANEOUS DAILY
Qty: 7 SYRINGE | Refills: 0 | Status: SHIPPED
Start: 2018-07-03 | End: 2018-07-18 | Stop reason: ALTCHOICE

## 2018-07-03 RX ORDER — OXYCODONE HYDROCHLORIDE 5 MG/1
5 TABLET ORAL
Qty: 15 TAB | Refills: 0 | Status: SHIPPED | OUTPATIENT
Start: 2018-07-03 | End: 2018-07-03

## 2018-07-03 RX ORDER — ONDANSETRON 4 MG/1
4 TABLET, ORALLY DISINTEGRATING ORAL
Status: DISCONTINUED | OUTPATIENT
Start: 2018-07-03 | End: 2018-07-03 | Stop reason: HOSPADM

## 2018-07-03 RX ORDER — ONDANSETRON 4 MG/1
4 TABLET, ORALLY DISINTEGRATING ORAL
Qty: 20 TAB | Refills: 0 | Status: SHIPPED | OUTPATIENT
Start: 2018-07-03 | End: 2018-07-18 | Stop reason: ALTCHOICE

## 2018-07-03 RX ORDER — OXYCODONE HYDROCHLORIDE 5 MG/1
5 TABLET ORAL
Qty: 15 TAB | Refills: 0 | Status: SHIPPED | OUTPATIENT
Start: 2018-07-03 | End: 2018-07-18 | Stop reason: ALTCHOICE

## 2018-07-03 RX ADMIN — ACETAMINOPHEN 650 MG: 325 TABLET ORAL at 05:44

## 2018-07-03 RX ADMIN — SODIUM CHLORIDE 40 MG: 9 INJECTION, SOLUTION INTRAMUSCULAR; INTRAVENOUS; SUBCUTANEOUS at 09:54

## 2018-07-03 RX ADMIN — ACETAMINOPHEN 650 MG: 325 TABLET ORAL at 09:54

## 2018-07-03 RX ADMIN — ENOXAPARIN SODIUM 40 MG: 100 INJECTION SUBCUTANEOUS at 09:55

## 2018-07-03 RX ADMIN — ACETAMINOPHEN 650 MG: 325 TABLET ORAL at 02:12

## 2018-07-03 RX ADMIN — KETOROLAC TROMETHAMINE 30 MG: 30 INJECTION, SOLUTION INTRAMUSCULAR at 05:44

## 2018-07-03 RX ADMIN — ACETAMINOPHEN 650 MG: 325 TABLET ORAL at 14:03

## 2018-07-03 RX ADMIN — ALBUTEROL SULFATE 2.5 MG: 2.5 SOLUTION RESPIRATORY (INHALATION) at 14:41

## 2018-07-03 RX ADMIN — ALBUTEROL SULFATE 2.5 MG: 2.5 SOLUTION RESPIRATORY (INHALATION) at 07:59

## 2018-07-03 NOTE — NURSE NAVIGATOR
Patient sitting up comfortably in a chair. Pain and nausea under control. Discussed with patient her discharge goals. 4oz per hour x 3 hours of bariatric clear liquids, walking and using ICS. Patient verbalized understanding. Also informed patient that she can restart plaquenil whenever she would like but it can cause nausea and we recommend holding it for 2 weeks if possible. Patient verbalized understanding. Will return this afternoon to go over discharge instructions.

## 2018-07-03 NOTE — PROGRESS NOTES
0800  Been up  Walking in the hallway, pain under control, voiding well, triflo  Been  Use, incision looks clean. 1000  Offered pain med but refused at this time. 1200  Sore but not needing pain med, offered mojgan but she said she will wait for the Tylenol at 1400, been up  Walking, meet criteria, I called jessica, the NP.to came and  Disc harge pt.    1500  Voiding well, pain under control, incision looks clean, aware for discharge today. 1600  Discharge in good spirit.

## 2018-07-03 NOTE — PROGRESS NOTES
Problem: Falls - Risk of  Goal: *Absence of Falls  Document Viv Fall Risk and appropriate interventions in the flowsheet.    Outcome: Progressing Towards Goal  Fall Risk Interventions:  Mobility Interventions: Patient to call before getting OOB    Mentation Interventions: Door open when patient unattended    Medication Interventions: Patient to call before getting OOB    Elimination Interventions: Call light in reach    History of Falls Interventions: Door open when patient unattended

## 2018-07-03 NOTE — NURSE NAVIGATOR
Post op diet progression discussed with patient. Patient to be discharged on a bariatric clear liquid diet. Patient verbalizes understanding of bariatric clear liquid and bariatric soft and moist diet. All of the patients questions and concerns have been addressed prior to discharge. Patient to follow up with surgeon in 7-14 days. Lovenox self injection instructions given. General Care after Surgery    1. No lifting over 15 lbs for 4 weeks. 2. No driving while taking the pain medication (approximately 7-10 days). 3. No tub baths, swimming or hot tubs until incisions are healed. (about 2 weeks)    4. You may shower. Clean incisions daily /gently with soap and check incisions for signs of infection:   Redness around incision   Swelling at site   Drainage with an foul odor (pus)   Increase tenderness around incision    5. Take your temperature and resting pulse in the morning and evening. Record on tracking form given to you. Call if your temperature is greater than 101 or your pulse rate is greater than 115.    6. Please contact your surgeon if you are having excessive abdominal pain (that lasts longer than 4 hours and does not improve with prescribed pain medication), vomiting or shortness of breath. 7. Get up and move - do not sit in one place for more than an hour. 8. You need to WALK (EXERCISE) for 30 minutes per day. (Walking around your house does not count)      a. Bike, treadmill and elliptical are OK  b. NO weight lifting or sit ups    9. If constipated take an adult dose of Miralax (available over the counter). Contact the doctors office if Miralax doesnt help. 10.  You may swallow pills starting the day after surgery as long as they fit inside this [place]:      11.  Continue to use your incentive spirometer (breathing machine) for the next couple of weeks to help prevent pneumonia. Phone numbers:   [facility] at [facility] [phone]  [facility] at [place] [address]  [address] phone #: [phone]  Ricarda Mann phone# [phone]   Florestine Camera phone#: [phone]  Cierras phone#: [phone]    Key Diet Principles Following Bariatric Surgery     1. Begin each meal with soft moist high protein foods (i.e. chicken, turkey, yogurt, tuna, eggs, cottage cheese, other fish and seafood). 2.  Consume a minimum of 64 oz. of fluid each day to prevent dehydration. No straws. 3.  No food and fluid together. Stop drinking 30 minutes before a meal.  You may begin fluids again 30 minutes after you finish a meal.    4.  Eat very slowly and chew all foods completely. 6.  Keep portions small. 7.  No simple sugars or high fat foods. No carbonated beverages. No Caffeine. 8.  Eat 3 meals per day. No skipping. Avoid snacking between meals. 9.  No alcohol. No Smoking. 10. Two [place] Complete Chewable vitamins each day. Take one in the morning and one at night. 11. 1500 mg Calcium Citrate per day in separate dosages      12. Vitamin D 3: 5000 IU taken per day. 13. Vitamin B-12:  Take 1000 mcg sublingually daily    14. Iron: 60 mg per day from [employer]      15. Protein supplements of your choice. Must be low sugar (0-3 g), low fat    (0-3 g) and provide at least 35-40 g of protein each day. You need a total     (food + supplements) of 60 - 70 grams of protein each day     16. Minimum of 30 minutes of physical activity daily. Александр Grier Gastric Bypass and Sleeve Dietary Progression    Patient Name:   Date of Surgery: 7/2/18     Ice Chips start once admitted on floor. Begin Bariatric Clear Liquid Diet on: 7/3/18    Clear Liquid Diet: 64 oz. of fluid per day  o Low calorie, low sugar, non-carbonated beverages  - Water, Crystal Light, Propel Water, Sugar Free Jell-O, Sugar Free Popsicles, Bouillon  - Start protein supplement during this stage.  (60-70 grams per day)  - Getting your fluid in and staying hydrated is your #1 priority! - The clear liquid diet will last for 7 days. Begin Bariatric Soft and Moist on: 7/10/18  - This stage of the diet will last for 5 weeks, unless otherwise instructed by your surgeon. - Begin:  1 week post-op   - End:  6 weeks post-op (or when you follow up with the Registered Dietitian)    - Soft, moist, high protein foods: 3 meals per day plus protein supplements. o   Portions should emphasize on soft protein. o Portions will be a MAXIMUM of:  o  1 ounce of solid food  o  2-3 ounces of cottage cheese and yogurt. o Protein supplements should be between meals and provide 30-40 grams per day during soft protein diet. o Continue to get 64 ounces of fluid in per day. - Protein foods that are ok on the Soft and Moist Diet include:  o Slow transition:  o 1st week on soft protein should focus on: Yogurt, cottage cheese, eggs  o 2nd -4th  week on soft protein diet should focus on: yogurt, cottage cheese, eggs, canned tuna, canned chicken, tilapia, fish (needs to be soft enough to be cut up with a fork)  o 5th week on soft protein diet should focus on: Yogurt, cottage cheese, eggs, canned tuna, canned chicken, tilapia, fish, salmon, chicken breast, or turkey. Remember to continue to get 64 ounces of fluid daily on ALL Stages. To be advanced to Bariatric Maintenance Stage of the bariatric diet, follow up with the dietitian 6 weeks post-op, around:      TEMPERATURE/HEART RATE LOG  WEEK 1  Day Date Morning temperature Morning heart rate Evening temperature Evening heart rate   1        2        3        4        5        6        7          WEEK 2  Day Date Morning temperature Morning heart rate Evening temperature Evening heart rate   1        2        3        4        5        6        7          Instructions: Take your temperature and heart rate (pulse) twice a day for 14 days.   Take both in the morning and evening at about the same time each day (when you wake up and before you go to bed when you are relaxed)  Please contact your doctors office if your:  ? Temperature is higher than 101°  ? Heart rate (pulse) is higher than 115 beats per minute    (normal heart rate is  beats per minute)    [place]  [phone]  [place]:    [phone]  Dr. Brandie Rodríguez   [phone]      HOW TO TAKE YOUR HEART RATE (PULSE)         How to do it:  1. Turn your left hand so that your palm is face-up. 2. With the index and middle fingers of your right hand, draw a line from the base of your thumb to just below the crease in your wrist. Your fingers should nestle just to the left of the large tendon that pops up when you bend your wrist toward you. 3. Dont press too hard, that will make the pulse go away. Use gentle pressure. 4. Wait. It can take several seconds--and several micro-adjustments in the placement of your two fingers on your wrist--to find your pulse. Just keep moving your fingers down or up your wrist in small increments (and pausing for a few seconds) until you find it. 5. To take your pulse rate:  1. Find a watch with a second hand and place it on your right wrist or on the table next to your left hand. 2. After finding your pulse, count the number of beats for 20 seconds. 3. Multiply by 3 to get your heart rate, or beats per minute (or just count for 60 seconds for a math-free option). 4. Normal, resting heart rate is about  beats per minute.

## 2018-07-03 NOTE — PROGRESS NOTES
Care Management Interventions  PCP Verified by CM: Yes (seen preop)  Palliative Care Criteria Met (RRAT>21 & CHF Dx)?: No  Mode of Transport at Discharge:  Other (see comment) ( to drive)  Transition of Care Consult (CM Consult): Discharge Planning  MyChart Signup: No  Discharge Durable Medical Equipment: No  Physical Therapy Consult: No  Occupational Therapy Consult: No  Speech Therapy Consult: No  Current Support Network: Lives with Spouse, Other (has three children at home)  Confirm Follow Up Transport: Family  Plan discussed with Pt/Family/Caregiver: Yes  [place] Resource Information Provided?: No  Discharge Location  Discharge Placement: Home

## 2018-07-03 NOTE — PROGRESS NOTES
Surgery Progress Note    7/3/2018    Admit Date: 7/2/2018    Subjective:     Patient has complaints of nothing Pain is controlled with current regimen. Patient has been ambulating in halls. She reports no nausea and no vomiting and is tolerating ice chips well. Objective:     Blood pressure 124/81, pulse (!) 58, temperature 98.1 °F (36.7 °C), resp. rate 15, height 5' 2\" (1.575 m), weight 93.9 kg (207 lb), SpO2 93 %. 07/01 1901 - 07/03 0700  In: 4060 [P.O.:160; I.V.:3900]  Out: 3050 [Urine:3025]    EXAM: GENERAL: alert, pleasant, no distress   HEART: regular rate and rhythm   LUNGS: clear to auscultation   ABDOMEN:  Soft, obese, appropriately tender, nondistended, incisions clean, dry, no erythema or drainage   EXTREMITIES: warm, well perfused    Data Review    Recent Results (from the past 24 hour(s))   CBC W/O DIFF    Collection Time: 07/03/18  5:45 AM   Result Value Ref Range    WBC 12.0 4.6 - 13.2 K/uL    RBC 4.49 4.20 - 5.30 M/uL    HGB 12.7 12.0 - 16.0 g/dL    HCT 38.4 35.0 - 45.0 %    MCV 85.5 74.0 - 97.0 FL    MCH 28.3 24.0 - 34.0 PG    MCHC 33.1 31.0 - 37.0 g/dL    RDW 12.7 11.6 - 14.5 %    PLATELET 059 664 - 418 K/uL    MPV 10.5 9.2 - 63.1 FL   METABOLIC PANEL, BASIC    Collection Time: 07/03/18  5:45 AM   Result Value Ref Range    Sodium 138 136 - 145 mmol/L    Potassium 3.8 3.5 - 5.5 mmol/L    Chloride 103 100 - 108 mmol/L    CO2 27 21 - 32 mmol/L    Anion gap 8 3.0 - 18 mmol/L    Glucose 77 74 - 99 mg/dL    BUN 7 7.0 - 18 MG/DL    Creatinine 0.45 (L) 0.6 - 1.3 MG/DL    BUN/Creatinine ratio 16 12 - 20      GFR est AA >60 >60 ml/min/1.73m2    GFR est non-AA >60 >60 ml/min/1.73m2    Calcium 8.5 8.5 - 10.1 MG/DL       Assessment:   Vazquez Quispe is a 41 y.o. female, postop day 1 status post laparoscopic gastric bypass surgery.   Condition: good    Plan:   -Ambulate every four hours  -Oxycodone 5mg 1-2 tabs po every 4-6 hour prn pain uncontrolled by tylenol  -Advance to Clear liquid Gastric Bypass Diet, if able to tolerate clear liquid diet 4oz per hour one of which being a protein supplement, will discharge home later today

## 2018-07-18 ENCOUNTER — OFFICE VISIT (OUTPATIENT)
Dept: SURGERY | Age: 43
End: 2018-07-18

## 2018-07-18 VITALS
HEIGHT: 62 IN | BODY MASS INDEX: 34.41 KG/M2 | DIASTOLIC BLOOD PRESSURE: 79 MMHG | WEIGHT: 187 LBS | SYSTOLIC BLOOD PRESSURE: 111 MMHG | TEMPERATURE: 97.3 F | HEART RATE: 84 BPM | RESPIRATION RATE: 20 BRPM

## 2018-07-18 DIAGNOSIS — K91.2 POST-RESECTION MALABSORPTION: Primary | ICD-10-CM

## 2018-07-18 NOTE — PROGRESS NOTES
Subjective:      Ana Pastor is a 41 y.o. female is now 2 weeks status post laparoscopic gastric bypass surgery. Doing well overall. Currently on a stage 3 diet without difficulty. Taking in 64oz water,  60g protein. 30min of activity daily. Bowel movements are regular. The patient is not having any pain. .  The patient is compliant with multivitamins, calcium and B12 supplements. Weight Loss Metrics 2018   Pre op / Initial Wt 216 - - - 216 - 218   Today's Wt - 187 lb - 207 lb - 212 lb -   BMI - 34.2 kg/m2 37.86 kg/m2 - - 38.78 kg/m2 -   Ideal Body Wt 125 - - - 125 - 125   Excess Body Wt 91 - - - 91 - 93   Goal Wt 143 - - - 143 - 144   Wt loss to date 29 - - - 4 - 0   % Wt Loss 0.4 - - - 0.05 - 0   80% EBW 72.8 - - - 72.8 - 74.4       Body mass index is 34.2 kg/(m^2). Comorbidities:    Hypertension: not applicable  Diabetes: not applicable  Obstructive Sleep Apnea: improved  Hyperlipidemia: not applicable  Stress Urinary Incontinence: not applicable  Gastroesophageal Reflux: not applicable  Weight related arthropathy:not applicable        Past Medical History:   Diagnosis Date    Asthma     exercised induced    Autoimmune disease ([facility] [address]     Rheumatoid arthrtis    Morbid obesity ([facility] [address]     Nausea & vomiting     Rheumatoid arthritis ([facility] [address] 2016    Sleep apnea     Uses CPAP       Past Surgical History:   Procedure Laterality Date    HX APPENDECTOMY      HX  SECTION      HX CHOLECYSTECTOMY      HX GASTRIC BYPASS  2018    HX HYSTERECTOMY         Current Outpatient Prescriptions   Medication Sig Dispense Refill    cholecalciferol, vitamin D3, (VITAMIN D3 PO) Take  by mouth.  esomeprazole (NEXIUM) 20 mg capsule Take 20 mg by mouth daily.  pediatric multivitamins chewable tablet Take 1 Tab by mouth daily.  Calcium-Cholecalciferol, D3, 600 mg(1,500mg) -400 unit chew Take 1 Tab by mouth daily.  cyanocobalamin (VITAMIN B-12) 1,000 mcg sublingual tablet Take 1,000 mcg by mouth daily.  albuterol (PROVENTIL HFA, VENTOLIN HFA, PROAIR HFA) 90 mcg/actuation inhaler Take  inhaled by mouth.  ergocalciferol (ERGOCALCIFEROL) 50,000 unit capsule Take 50,000 Units by mouth every seven (7) days.  hydroxychloroquine (PLAQUENIL) 200 mg tablet TAKE 1 TABLET BY MOUTH TWICE A DAY WITH FOOD OR MILK  1    estradiol (ESTRACE) 2 mg tablet TAKE 1 TABLET BY MOUTH EVERY DAY  5       Allergies   Allergen Reactions    Promethazine Unknown (comments)     Other reaction(s): psychological reaction  Hallucinations         Objective:     Visit Vitals    /79 (BP 1 Location: Left arm, BP Patient Position: At rest)    Pulse 84    Temp 97.3 °F (36.3 °C) (Oral)    Resp 20    Ht 5' 2\" (1.575 m)    Wt 84.8 kg (187 lb)    BMI 34.2 kg/m2       General:  alert, cooperative, no distress, appears stated age   Chest: no accessory muscle use   Cor:   Regular rate and rhythm   Abdomen: soft, bowel sounds active, non-tender   Incision:   healing well, no drainage, no erythema, no hernia, no seroma, no swelling, no dehiscence, incision well approximated       Labs: none    Assessment:     Doing well postoperatively.     Plan:     Increase activity to the goal of 30 minutes daily, Okay to proceed with [facility], Follow up labs as ordered, Increase fluids, Follow up with Registered Dietician and Continue MVI/Ca/B12 supplementation  Follow up in 3 months

## 2018-07-18 NOTE — PATIENT INSTRUCTIONS
If you have any questions or concerns about today's appointment, the verbal and/or written instructions you were given for follow up care, please call our office at [phone].     [facility] - [address]    [phone] office  [phone]

## 2018-07-18 NOTE — PROGRESS NOTES
Terese Otero is a 41 y.o. female that presents today to follow up post  LGBP preformed on 7/2/2018 . Pt says pain level is at a 0 on a scale from 1-10. Pt is drinking 64oz of fluid daily. Pt is currently eating eggs, tuna, protein shake,yogurt. Pt says the amount of time spent exercising is walking 30 minutes daily. Body mass index is 34.2 kg/(m^2). 1. Have you been to the ER, urgent care clinic since your last visit? Hospitalized since your last visit? No    2. Have you seen or consulted any other health care providers outside of the [facility] since your last visit? Include any pap smears or colon screening.  No

## 2018-07-18 NOTE — COMMUNICATION BODY
Subjective:      Mitch Him is a 41 y.o. female is now 2 weeks status post laparoscopic gastric bypass surgery. Doing well overall. Currently on a stage 3 diet without difficulty. Taking in 64oz water,  60g protein. 30min of activity daily. Bowel movements are regular. The patient is not having any pain. .  The patient is compliant with multivitamins, calcium and B12 supplements. Weight Loss Metrics 2018   Pre op / Initial Wt 216 - - - 216 - 218   Today's Wt - 187 lb - 207 lb - 212 lb -   BMI - 34.2 kg/m2 37.86 kg/m2 - - 38.78 kg/m2 -   Ideal Body Wt 125 - - - 125 - 125   Excess Body Wt 91 - - - 91 - 93   Goal Wt 143 - - - 143 - 144   Wt loss to date 29 - - - 4 - 0   % Wt Loss 0.4 - - - 0.05 - 0   80% EBW 72.8 - - - 72.8 - 74.4       Body mass index is 34.2 kg/(m^2). Comorbidities:    Hypertension: not applicable  Diabetes: not applicable  Obstructive Sleep Apnea: improved  Hyperlipidemia: not applicable  Stress Urinary Incontinence: not applicable  Gastroesophageal Reflux: not applicable  Weight related arthropathy:not applicable        Past Medical History:   Diagnosis Date    Asthma     exercised induced    Autoimmune disease ([facility] [address]     Rheumatoid arthrtis    Morbid obesity ([facility] [address]     Nausea & vomiting     Rheumatoid arthritis ([facility] [address] 2016    Sleep apnea     Uses CPAP       Past Surgical History:   Procedure Laterality Date    HX APPENDECTOMY      HX  SECTION      HX CHOLECYSTECTOMY      HX GASTRIC BYPASS  2018    HX HYSTERECTOMY         Current Outpatient Prescriptions   Medication Sig Dispense Refill    cholecalciferol, vitamin D3, (VITAMIN D3 PO) Take  by mouth.  esomeprazole (NEXIUM) 20 mg capsule Take 20 mg by mouth daily.  pediatric multivitamins chewable tablet Take 1 Tab by mouth daily.  Calcium-Cholecalciferol, D3, 600 mg(1,500mg) -400 unit chew Take 1 Tab by mouth daily.  cyanocobalamin (VITAMIN B-12) 1,000 mcg sublingual tablet Take 1,000 mcg by mouth daily.  albuterol (PROVENTIL HFA, VENTOLIN HFA, PROAIR HFA) 90 mcg/actuation inhaler Take  inhaled by mouth.  ergocalciferol (ERGOCALCIFEROL) 50,000 unit capsule Take 50,000 Units by mouth every seven (7) days.  hydroxychloroquine (PLAQUENIL) 200 mg tablet TAKE 1 TABLET BY MOUTH TWICE A DAY WITH FOOD OR MILK  1    estradiol (ESTRACE) 2 mg tablet TAKE 1 TABLET BY MOUTH EVERY DAY  5       Allergies   Allergen Reactions    Promethazine Unknown (comments)     Other reaction(s): psychological reaction  Hallucinations         Objective:     Visit Vitals    /79 (BP 1 Location: Left arm, BP Patient Position: At rest)    Pulse 84    Temp 97.3 °F (36.3 °C) (Oral)    Resp 20    Ht 5' 2\" (1.575 m)    Wt 84.8 kg (187 lb)    BMI 34.2 kg/m2       General:  alert, cooperative, no distress, appears stated age   Chest: no accessory muscle use   Cor:   Regular rate and rhythm   Abdomen: soft, bowel sounds active, non-tender   Incision:   healing well, no drainage, no erythema, no hernia, no seroma, no swelling, no dehiscence, incision well approximated       Labs: none    Assessment:     Doing well postoperatively.     Plan:     Increase activity to the goal of 30 minutes daily, Okay to proceed with [facility], Follow up labs as ordered, Increase fluids, Follow up with Registered Dietician and Continue MVI/Ca/B12 supplementation  Follow up in 3 months

## 2018-07-18 NOTE — MR AVS SNAPSHOT
[facility] 
 
 
 [address] [place] [address] [id number] 
[phone] Patient: Ava Kearns MRN: [id number] :1975 Visit Information Date & Time Provider Department Dept. Phone Encounter #  
 2018 10:00 AM Ayaka Euceda MD [employer] [facility] [phone] [id number] Your Appointments 2018  1:30 PM  
NUTRITION COUNSELING with [facility] DIETICIAN [address] ([facility]) Appt Note: post op [address] [place] [facility]  
  
   
 [address] [facility] [address] [facility] 951 Upcoming Health Maintenance Date Due DTaP/Tdap/Td series (1 - Tdap) 1998 PAP AKA CERVICAL CYTOLOGY 1998 Influenza Age 5 to Adult 2018 Allergies as of 2018  Review Complete On: 2018 By: Mehdi Dias Severity Noted Reaction Type Reactions Promethazine Medium 2008    Unknown (comments) Other reaction(s): psychological reaction Hallucinations Current Immunizations  Never Reviewed No immunizations on file. Not reviewed this visit You Were Diagnosed With   
  
 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  
 34.2 kg/m 2 1.93 m 2 Your Updated Medication List  
  
   
This list is accurate as of 18 10:47 AM.  Always use your most recent med list.  
  
  
  
  
 albuterol 90 mcg/actuation inhaler Commonly known as:  PROVENTIL HFA, VENTOLIN HFA, PROAIR HFA Take  inhaled by mouth. Calcium-Cholecalciferol (D3) 600 mg(1,500mg) -400 unit Chew Take 1 Tab by mouth daily. ergocalciferol 50,000 unit capsule Commonly known as:  ERGOCALCIFEROL Take 50,000 Units by mouth every seven (7) days. esomeprazole 20 mg capsule Commonly known as:  Rexie Umesh Take 20 mg by mouth daily. estradiol 2 mg tablet Commonly known as:  ESTRACE  
TAKE 1 TABLET BY MOUTH EVERY DAY  
  
 hydroxychloroquine 200 mg tablet Commonly known as:  PLAQUENIL  
TAKE 1 TABLET BY MOUTH TWICE A DAY WITH FOOD OR MILK  
  
 pediatric multivitamins chewable tablet Take 1 Tab by mouth daily. VITAMIN B-12 1,000 mcg sublingual tablet Generic drug:  cyanocobalamin Take 1,000 mcg by mouth daily. VITAMIN D3 PO Take  by mouth. Patient Instructions If you have any questions or concerns about today's appointment, the verbal and/or written instructions you were given for follow up care, please call our office at [phone]. [facility] - [address] 
 
[phone] office [phone] Introducing [facility]! [facility] Trevino introduces [employer] patient portal. Now you can access parts of your medical record, email your doctor's office, and request medication refills online. 1. In your internet browser, go to [email or web address] 2. Click on the First Time User? Click Here link in the Sign In box. You will see the New Member Sign Up page. 3. Enter your [employer] Access Code exactly as it appears below. You will not need to use this code after youve completed the sign-up process. If you do not sign up before the expiration date, you must request a new code. · [employer] Access Code: [email or web address] Expires: 9/23/2018  8:57 AM 
 
 4. Enter the last four digits of your Social Security Number (xxxx) and Date of Birth (mm/dd/yyyy) as indicated and click Submit. You will be taken to the next sign-up page. 5. Create a [employer] ID. This will be your [employer] login ID and cannot be changed, so think of one that is secure and easy to remember. 6. Create a [employer] password. You can change your password at any time. 7. Enter your Password Reset Question and Answer. This can be used at a later time if you forget your password. 8. Enter your e-mail address. You will receive e-mail notification when new information is available in [address] 9. Click Sign Up. You can now view and download portions of your medical record. 10. Click the Download Summary menu link to download a portable copy of your medical information. If you have questions, please visit the Frequently Asked Questions section of the [employer] website. Remember, [employer] is NOT to be used for urgent needs. For medical emergencies, dial 911. Now available from your iPhone and Android! Please provide this summary of care documentation to your next provider. Your primary care clinician is listed as Dionisio Lion. If you have any questions after today's visit, please call [phone].

## 2018-07-18 NOTE — LETTER
2018 10:44 AM 
 
Patient:  Vazquez Quispe YOB: 1975 Date of Visit: 2018 MD [address] 
[address] [facility] [address] VIA Facsimile: [phone] Dear Ryan Marques MD, Thank you for referring Ms. Klaudia Gomez to [address] [address] for evaluation and treatment. Below are the relevant portions of my assessment and plan of care. Subjective:  
  
Vazquez Quispe is a 41 y.o. female is now 2 weeks status post laparoscopic gastric bypass surgery. Doing well overall. Currently on a stage 3 diet without difficulty. Taking in 64oz water,  60g protein. 30min of activity daily. Bowel movements are regular. The patient is not having any pain. Joss Shillings The patient is compliant with multivitamins, calcium and B12 supplements. Weight Loss Metrics 2018 Pre op / Initial Wt 216 - - - 216 - 218 Today's Wt - 187 lb - 207 lb - 212 lb -  
BMI - 34.2 kg/m2 37.86 kg/m2 - - 38.78 kg/m2 - Ideal Body Wt 125 - - - 125 - 125 Excess Body Wt 91 - - - 91 - 93 Goal Wt 143 - - - 143 - 144 Wt loss to date 29 - - - 4 - 0  
% Wt Loss 0.4 - - - 0.05 - 0  
80% EBW 72.8 - - - 72.8 - 74.4 Body mass index is 34.2 kg/(m^2). Comorbidities: Hypertension: not applicable Diabetes: not applicable Obstructive Sleep Apnea: improved Hyperlipidemia: not applicable Stress Urinary Incontinence: not applicable Gastroesophageal Reflux: not applicable Weight related arthropathy:not applicable Past Medical History:  
Diagnosis Date  Asthma   
 exercised induced  Autoimmune disease ([address] Rheumatoid arthrtis  Morbid obesity ([facility]  Nausea & vomiting  Rheumatoid arthritis ([facility] [facility] 2016  Sleep apnea Uses CPAP Past Surgical History:  
Procedure Laterality Date [address]  HX  SECTION    
  HX CHOLECYSTECTOMY  HX GASTRIC BYPASS  07/02/2018  HX HYSTERECTOMY  2014 Current Outpatient Prescriptions Medication Sig Dispense Refill  cholecalciferol, vitamin D3, (VITAMIN D3 PO) Take  by mouth.  esomeprazole (NEXIUM) 20 mg capsule Take 20 mg by mouth daily.  pediatric multivitamins chewable tablet Take 1 Tab by mouth daily.  Calcium-Cholecalciferol, D3, 600 mg(1,500mg) -400 unit chew Take 1 Tab by mouth daily.  cyanocobalamin (VITAMIN B-12) 1,000 mcg sublingual tablet Take 1,000 mcg by mouth daily.  albuterol (PROVENTIL HFA, VENTOLIN HFA, PROAIR HFA) 90 mcg/actuation inhaler Take  inhaled by mouth.  ergocalciferol (ERGOCALCIFEROL) 50,000 unit capsule Take 50,000 Units by mouth every seven (7) days.  hydroxychloroquine (PLAQUENIL) 200 mg tablet TAKE 1 TABLET BY MOUTH TWICE A DAY WITH FOOD OR MILK  1  
 estradiol (ESTRACE) 2 mg tablet TAKE 1 TABLET BY MOUTH EVERY DAY  5 Allergies Allergen Reactions  Promethazine Unknown (comments) Other reaction(s): psychological reaction Hallucinations Objective:  
 
Visit Vitals  /79 (BP 1 Location: Left arm, BP Patient Position: At rest)  Pulse 84  Temp 97.3 °F (36.3 °C) (Oral)  Resp 20  
 Ht 5' 2\" (1.575 m)  Wt 84.8 kg (187 lb)  BMI 34.2 kg/m2 General:  alert, cooperative, no distress, appears stated age Chest: no accessory muscle use Cor:   Regular rate and rhythm Abdomen: soft, bowel sounds active, non-tender Incision:   healing well, no drainage, no erythema, no hernia, no seroma, no swelling, no dehiscence, incision well approximated Labs: none Assessment:  
 
Doing well postoperatively. Plan:  
 
Increase activity to the goal of 30 minutes daily, Okay to proceed with [facility], Follow up labs as ordered, Increase fluids, Follow up with Registered Dietician and Continue MVI/Ca/B12 supplementation Follow up in 3 months Thank you very much for your referral of Ms. Alethea Porter. If you have questions, please do not hesitate to call me. I look forward to following Ms. Cheyanne Guerra along with you and will keep you updated as to her progress.   
 
 
 
 
Sincerely, 
 
 
Ayaka Euceda MD

## 2018-07-19 NOTE — DISCHARGE SUMMARY
Bariatric Surgery Discharge Progress Note    Admission Date: 7/2/2018    Discharge Date: 7/3/2018      Admission Diagnosis:    Clinically severe Obesity    Comorbidities:  obstructive sleep apnea - CPAP    Discharge Diagnosis:     Clinically Severe Obesity, s/p laparoscopic gastric bypass surgery with comorbidities as listed above    Procedures:   laparoscopic gastric bypass surgery    Postop Complications: none    Hospital Course:  Patient was admitted on 7/2/2018 for scheduled bariatric surgery. Operation was without significant complication. Patient admitted to the floor postoperatively, monitored as per protocol. Diet sequentially advanced beginning POD 1, pain medications transitioned to oral during the hospital course. At the time of discharge, the patient is afebrile, vital signs stable, tolerating a clear liquid diet with protein supplementation, voiding spontaneously, ambulatory with adequate pain control with oral medications and clear surgical sites without evidence of infection.     Discharge Diet:  Clear Liquid Bariatric Diet for 7 days, then soft moist protein diet for 5 weeks    Discharge Medications:   *All medications as per Medical Reconciliation Form\"    Flintstones Complete Chewable vitamins, 2 orally daily for life  Calcium Citrate 2000mg orally daily for life  Vitamin B12 1000micrograms sublingual daily for life  Oxycodone 5mg tab 1-2 by mouth every 4-6 hours as needed for pain uncontrolled with Tylenol  Enoxaparin (Lovenox) 40mg sub-Q daily for 7 days    Discharge disposition: home      Local wound care with daily showers, keep wounds clean and dry    Activity: as desired, no lifting greater than 15lbs or situps for 30 days    Special Instructions:   No driving until activity is not influenced by incisional pain and off narcotics   No bath or hot tub until wounds are healed   Pulse and temperature twice daily for 10 days   Notify [employer] for a Temp >100.5 or Pulse>115    Followup with surgeon in 2 weeks

## 2018-08-07 ENCOUNTER — DOCUMENTATION ONLY (OUTPATIENT)
Dept: SURGERY | Age: 43
End: 2018-08-07

## 2018-08-07 NOTE — PROGRESS NOTES
[facility]  POST-OP NUTRITION FOLLOW UP    Patient's Name: Jase Cullen  YOB: 1975  Surgery Date: 07/02/2018    Procedure: LGBP  Surgeon: Iza Diamond    Height: 5'2\"   Pre-Op Weight: 216   Current Weight: 179  Weight Lost: 26#      Complications  Readmittance: no  Reoperations: no  Complications: no  IV Fluids: no  ER Trips: no    Problem Areas:   Nausea: yes, 1st few days. Vomiting: no   Dumping Syndrome: no  Inadequate Protein: no  Inadequate Fluids: no  Food Intolerance: no  Hunger: no  Constipation: no    Eating 3 Meals/Day:yes  Portion Size at Meals: 1 oz   Protein from Food: 21 grams    Foods being consumed:  Breakfast: Time:9:00  2 oz of yogurt, 1 oz of scrambled egg, cottage cheese  Lunch: Time: 12:00   Canned tuna, fat free bonner, leftovers from dinner. Dinner:  Time: 6:00   Chicken, fish. In-between eating: Protein drink    Length of time for meals: 20 minutes  food/fluids: yes    Fluids: 64  oz/day Types of Fluids: water, protein drinks,     MVI: Flintstones complete  Number/Day: 2 Taken Separately: yes    Calcium: 500 mg  Calcium Dosing: three times per day. Taken Separately: yes    Vitamin B12: 1000 mcg Vitamin B12 Dosing: daily    Vitamin D: 5000 iu   Vitamin D dosing: daily       Protein Supplement: Premier   Grams of Protein: 30     Patient is taking 7 days a week. Exercise (FITT): [employer] and [employer]      Comments:    Patient is doing very well. Patient was educated on the importance of eating meat and vegetables only. I have talked with patient about the effects of carbohydrates, not only from a weight management perspective, but also what effects it could have on their blood sugar and what reactive hypoglycemia is. Diet Follow Up:  9 month class scheduled.     Caridad Cintron, SOCORRO    8/7/2018

## 2018-10-29 ENCOUNTER — TELEPHONE (OUTPATIENT)
Dept: SURGERY | Age: 43
End: 2018-10-29

## 2018-10-29 ENCOUNTER — OFFICE VISIT (OUTPATIENT)
Dept: SURGERY | Age: 43
End: 2018-10-29

## 2018-10-29 VITALS
DIASTOLIC BLOOD PRESSURE: 71 MMHG | WEIGHT: 148.2 LBS | BODY MASS INDEX: 27.27 KG/M2 | HEIGHT: 62 IN | SYSTOLIC BLOOD PRESSURE: 118 MMHG | OXYGEN SATURATION: 96 % | RESPIRATION RATE: 16 BRPM | TEMPERATURE: 97.4 F | HEART RATE: 60 BPM

## 2018-10-29 DIAGNOSIS — E66.3 OVERWEIGHT: Primary | ICD-10-CM

## 2018-10-29 DIAGNOSIS — E55.9 HYPOVITAMINOSIS D: ICD-10-CM

## 2018-10-29 DIAGNOSIS — Z98.84 S/P GASTRIC BYPASS: ICD-10-CM

## 2018-10-29 DIAGNOSIS — K91.2 POST-RESECTION MALABSORPTION: Primary | ICD-10-CM

## 2018-10-29 DIAGNOSIS — R63.4 RAPID WEIGHT LOSS: ICD-10-CM

## 2018-10-29 DIAGNOSIS — K91.2 POST-RESECTION MALABSORPTION: ICD-10-CM

## 2018-10-29 NOTE — PROGRESS NOTES
Ghanshyam Costa is a 41 y.o. female is now 3 months status post laparoscopic gastric bypass surgery. Doing well overall. Currently on a stage 4 diet without difficulty. Taking in >64 oz water,  60 -80 g protein. 60- 90 min of activity 6 days a week. Bowel movements are regular. The patient is not having any pain. . She is compliant with multivitamins, calcium, Vit D and B12 supplements. Eating 3 meals and shakes PRN   Hair loss but its slowing now       Weight Loss Metrics 10/29/2018 10/29/2018 2018 2018 2018 2018 2018   Pre op / Initial Wt 216 - 216 - - - 216   Today's Wt - 148 lb 3.2 oz - 187 lb - 207 lb -   BMI - 27.11 kg/m2 - 34.2 kg/m2 37.86 kg/m2 - -   Ideal Body Wt - - 125 - - - 125   Excess Body Wt - - 91 - - - 91   Goal Wt - - 143 - - - 143   Wt loss to date 67.8 - 29 - - - 4   % Wt Loss - - 0.4 - - - 0.05   80% EBW - - 72.8 - - - 72.8       Body mass index is 27.11 kg/m². Comorbidities:    Hypertension: not applicable  Diabetes: not applicable  Obstructive Sleep Apnea: resolved  Hyperlipidemia: not applicable  Stress Urinary Incontinence: not applicable  Gastroesophageal Reflux: not applicable  Weight related arthropathy:improved        Past Medical History:   Diagnosis Date    Asthma     exercised induced    Autoimmune disease ([facility] [address]     Rheumatoid arthrtis    Morbid obesity ([facility] [address] [facility]     Nausea & vomiting     Rheumatoid arthritis ([facility] [address] 2016    Sleep apnea     Uses CPAP       Past Surgical History:   Procedure Laterality Date    HX APPENDECTOMY      HX  SECTION      HX CHOLECYSTECTOMY      HX GASTRIC BYPASS  2018    HX HYSTERECTOMY  2014       Current Outpatient Medications   Medication Sig Dispense Refill    cholecalciferol, vitamin D3, (VITAMIN D3 PO) Take  by mouth.  esomeprazole (NEXIUM) 20 mg capsule Take 20 mg by mouth daily.  pediatric multivitamins chewable tablet Take 1 Tab by mouth daily.       Calcium-Cholecalciferol, D3, 600 mg(1,500mg) -400 unit chew Take 1 Tab by mouth daily.  cyanocobalamin (VITAMIN B-12) 1,000 mcg sublingual tablet Take 1,000 mcg by mouth daily.  albuterol (PROVENTIL HFA, VENTOLIN HFA, PROAIR HFA) 90 mcg/actuation inhaler Take  inhaled by mouth.  ergocalciferol (ERGOCALCIFEROL) 50,000 unit capsule Take 50,000 Units by mouth every seven (7) days.  hydroxychloroquine (PLAQUENIL) 200 mg tablet TAKE 1 TABLET BY MOUTH TWICE A DAY WITH FOOD OR MILK  1    estradiol (ESTRACE) 2 mg tablet TAKE 1 TABLET BY MOUTH EVERY DAY  5       Allergies   Allergen Reactions    Promethazine Unknown (comments)     Other reaction(s): psychological reaction  Hallucinations       ROS:  Review of Systems   HENT:        Small hair loss    Gastrointestinal: Positive for nausea. Occasional        Physicial Exam:  Visit Vitals  /71 (BP 1 Location: Right arm, BP Patient Position: Sitting)   Pulse 60   Temp 97.4 °F (36.3 °C) (Oral)   Resp 16   Ht 5' 2\" (1.575 m)   Wt 67.2 kg (148 lb 3.2 oz)   SpO2 96%   BMI 27.11 kg/m²     Physical Exam   Constitutional: She is oriented to person, place, and time and well-developed, well-nourished, and in no distress. HENT:   Head: Normocephalic. Neck: Normal range of motion. Cardiovascular: Normal rate. Pulmonary/Chest: Effort normal.   Abdominal: Soft. Musculoskeletal: Normal range of motion. Neurological: She is alert and oriented to person, place, and time. Skin: Skin is warm and dry. Psychiatric: Affect normal.   Nursing note and vitals reviewed. Labs: pending     Assessment/Plan: Pt is currently 3 weeks s/p laparoscopic gastric bypass surgery with a total weight loss of 67.8 lbs to date, doing very well. Labs are pending and pt was instructed to continue current tashi supplements- will adjust with labs. Stressed importance of hydration with SF clear liquids until urine clear.   Continue with food content mainly meats/veggies, shakes PRN but d/c if cause of nausea. Advised exercise program of continue current.      Follow up 3mo and PRN  Follow up RD PRN and 9mo     >50% of 30 min visit spent counseling     JOHN Lozano-BC

## 2018-10-29 NOTE — PROGRESS NOTES
1. Have you been to the ER, urgent care clinic since your last visit? Hospitalized since your last visit? No    2. Have you seen or consulted any other health care providers outside of the [facility] since your last visit? Include any pap smears or colon screening. No    Patient presents for 3 month gastric bypass follow up.

## 2018-11-20 ENCOUNTER — HOSPITAL ENCOUNTER (OUTPATIENT)
Dept: LAB | Age: 43
Discharge: HOME | End: 2018-11-20
Payer: COMMERCIAL

## 2018-11-20 DIAGNOSIS — K91.2 POST-RESECTION MALABSORPTION: ICD-10-CM

## 2018-11-20 DIAGNOSIS — R63.4 RAPID WEIGHT LOSS: ICD-10-CM

## 2018-11-20 DIAGNOSIS — Z98.84 S/P GASTRIC BYPASS: ICD-10-CM

## 2018-11-20 DIAGNOSIS — E55.9 HYPOVITAMINOSIS D: ICD-10-CM

## 2018-11-20 DIAGNOSIS — E66.3 OVERWEIGHT: ICD-10-CM

## 2018-11-20 LAB
25(OH)D3 SERPL-MCNC: 50.1 NG/ML (ref 30–100)
ALBUMIN SERPL-MCNC: 3.9 G/DL (ref 3.4–5)
ANION GAP SERPL CALC-SCNC: 7 MMOL/L (ref 3–18)
BASOPHILS # BLD: 0 K/UL (ref 0–0.1)
BASOPHILS NFR BLD: 0 % (ref 0–2)
BUN SERPL-MCNC: 14 MG/DL (ref 7–18)
BUN/CREAT SERPL: 28 (ref 12–20)
CALCIUM SERPL-MCNC: 9.4 MG/DL (ref 8.5–10.1)
CHLORIDE SERPL-SCNC: 106 MMOL/L (ref 100–108)
CO2 SERPL-SCNC: 28 MMOL/L (ref 21–32)
CREAT SERPL-MCNC: 0.5 MG/DL (ref 0.6–1.3)
DIFFERENTIAL METHOD BLD: NORMAL
EOSINOPHIL # BLD: 0.1 K/UL (ref 0–0.4)
EOSINOPHIL NFR BLD: 3 % (ref 0–5)
ERYTHROCYTE [DISTWIDTH] IN BLOOD BY AUTOMATED COUNT: 13.3 % (ref 11.6–14.5)
FERRITIN SERPL-MCNC: 124 NG/ML (ref 8–388)
FOLATE SERPL-MCNC: 15.7 NG/ML (ref 3.1–17.5)
GLUCOSE SERPL-MCNC: 80 MG/DL (ref 74–99)
HCT VFR BLD AUTO: 42.5 % (ref 35–45)
HGB BLD-MCNC: 14.1 G/DL (ref 12–16)
IRON SERPL-MCNC: 69 UG/DL (ref 50–175)
LYMPHOCYTES # BLD: 2.2 K/UL (ref 0.9–3.6)
LYMPHOCYTES NFR BLD: 43 % (ref 21–52)
MCH RBC QN AUTO: 30.2 PG (ref 24–34)
MCHC RBC AUTO-ENTMCNC: 33.2 G/DL (ref 31–37)
MCV RBC AUTO: 91 FL (ref 74–97)
MONOCYTES # BLD: 0.3 K/UL (ref 0.05–1.2)
MONOCYTES NFR BLD: 6 % (ref 3–10)
NEUTS SEG # BLD: 2.4 K/UL (ref 1.8–8)
NEUTS SEG NFR BLD: 48 % (ref 40–73)
PLATELET # BLD AUTO: 216 K/UL (ref 135–420)
PMV BLD AUTO: 11.1 FL (ref 9.2–11.8)
POTASSIUM SERPL-SCNC: 3.7 MMOL/L (ref 3.5–5.5)
RBC # BLD AUTO: 4.67 M/UL (ref 4.2–5.3)
SODIUM SERPL-SCNC: 141 MMOL/L (ref 136–145)
VIT B12 SERPL-MCNC: 1076 PG/ML (ref 211–911)
WBC # BLD AUTO: 5 K/UL (ref 4.6–13.2)

## 2018-11-20 PROCEDURE — 82607 VITAMIN B-12: CPT

## 2018-11-20 PROCEDURE — 36415 COLL VENOUS BLD VENIPUNCTURE: CPT

## 2018-11-20 PROCEDURE — 83540 ASSAY OF IRON: CPT

## 2018-11-20 PROCEDURE — 85025 COMPLETE CBC W/AUTO DIFF WBC: CPT

## 2018-11-20 PROCEDURE — 82040 ASSAY OF SERUM ALBUMIN: CPT

## 2018-11-20 PROCEDURE — 82728 ASSAY OF FERRITIN: CPT

## 2018-11-20 PROCEDURE — 84425 ASSAY OF VITAMIN B-1: CPT

## 2018-11-20 PROCEDURE — 82306 VITAMIN D 25 HYDROXY: CPT

## 2018-11-20 PROCEDURE — 80048 BASIC METABOLIC PNL TOTAL CA: CPT

## 2018-11-24 LAB — VIT B1 BLD-SCNC: 87 NMOL/L (ref 66.5–200)

## 2019-01-22 ENCOUNTER — DOCUMENTATION ONLY (OUTPATIENT)
Dept: SURGERY | Age: 44
End: 2019-01-22

## 2019-01-22 NOTE — PROGRESS NOTES
Patient visits RD  Weight: 130#  Pt. Eating according to protocol  Pt. Drinking sufficient fluids  Pt. Taking vitamin/mineral supplements  Pt. Taking protein supplements well  Pt. Set to see RD yearly or as needed.

## 2019-04-29 ENCOUNTER — OFFICE VISIT (OUTPATIENT)
Dept: SURGERY | Age: 44
End: 2019-04-29

## 2019-04-29 VITALS
DIASTOLIC BLOOD PRESSURE: 78 MMHG | WEIGHT: 117 LBS | SYSTOLIC BLOOD PRESSURE: 128 MMHG | HEART RATE: 55 BPM | TEMPERATURE: 96.4 F | BODY MASS INDEX: 21.53 KG/M2 | OXYGEN SATURATION: 97 % | HEIGHT: 62 IN

## 2019-04-29 DIAGNOSIS — K91.2 POST-RESECTION MALABSORPTION: ICD-10-CM

## 2019-04-29 DIAGNOSIS — Z98.84 S/P GASTRIC BYPASS: ICD-10-CM

## 2019-04-29 DIAGNOSIS — R10.11 RIGHT UPPER QUADRANT ABDOMINAL PAIN: Primary | ICD-10-CM

## 2019-04-29 RX ORDER — PREDNISONE 20 MG/1
20 TABLET ORAL DAILY
Qty: 5 TAB | Refills: 0 | Status: SHIPPED | OUTPATIENT
Start: 2019-04-29 | End: 2019-05-04

## 2019-04-29 RX ORDER — OMEPRAZOLE 40 MG/1
40 CAPSULE, DELAYED RELEASE ORAL DAILY
Qty: 5 CAP | Refills: 0 | Status: SHIPPED | OUTPATIENT
Start: 2019-04-29 | End: 2019-05-03 | Stop reason: SDUPTHER

## 2019-04-29 NOTE — PATIENT INSTRUCTIONS
If you have any questions or concerns about today's appointment, the verbal and/or written instructions you were given for follow up care, please call our office at [phone].     [facility] - [address]    [phone] office  [phone]

## 2019-04-29 NOTE — PROGRESS NOTES
· Silva Mariano is a 45 y.o. female is now 9 months status post laparoscopic gastric bypass surgery. She presents urgently after several visits to the ED with unresolved right sided abdominal pain. Pain occurred instantly 04/13/19; pt concerned she pulled/ tore something due to lifting at a craft fair. Stabbing pain did not resolve but worsened from abdomen to side and around to right back. Since this time pain has never resolved or improved. Worse with palp. Unsure if associated with eating, at first it was not but now maybe worsening 15 mins after meal is consumed. Recently developed ongoing nausea. 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 Currently on a stage 4 diet, denies dietary indiscretions. Taking in >64 oz water,  60 -80 g protein. 60- 90 min of activity 3-4 days a week. Bowel movements are regular. She is compliant with multivitamins, calcium, Vit D and B12 supplements. Denies NSAID, ETOH, caffeine, and tobacco use.       4/26/19   GI, upper GI endoscopy preformed, results:        - Normal esophagus.       - Dilcia-en-Y gastrojejunostomy with gastrojejunal anastomosis characterized by healthy appearing mucosa. - No specimens collected. 4/25/19   MRI ABDOMEN WITH AND WITHOUT CONTRAST WITH MRCP:  1. No choledocholithiasis. Biliary tree is within normal limits postcholecystectomy. 2. Mild hepatomegaly. Trace nonspecific inferior right perihepatic free fluid. 3. Gastric bypass. 4/19/19   CT of the abdomen and pelvis:  No acute process    4/13/19  U/S abdomen   Postcholecystectomy changes but no acute sonographic abnormality in the right upper quadrant. 4/13/19  CTA chest   1. No evidence of pulmonary embolism  2. Mild bronchial wall thickening, most commonly seen in the setting of bronchitis or asthma. 4/13/19  Chest pa and lat   No acute cardiopulmonary process.       PT-INR (04/26/2019 3:25 AM EDT)  PT-INR (04/26/2019 3:25 AM EDT)   Component Value Ref Range Performed At Pathologist Signature   PROTIME 12.1 9.0 - 13.0 sec [facility]     INR 1.12 0.89 - 1.29 [facility]       PT-INR (04/26/2019 3:25 AM EDT)   Specimen   Blood - Blood     PT-INR (04/26/2019 3:25 AM EDT)   Narrative Performed At   Prothrombin Time (PT)    Suggested INR therapeutic range for Vitamin K antagonist therapy:    Standard Dose    (Moderate intensity therapeutic range): 2.0 - 3.0    (Higher intensity therapeutic range): 2.5 - 3.5   [facility]       PT-INR (04/26/2019 3:25 AM EDT)   Performing Organization Address City/[facility] Phone Number   [employer]  [address] [place] [address] 229 [phone]     Back to top of Lab Results       APTT (04/26/2019 3:25 AM EDT)  APTT (04/26/2019 3:25 AM EDT)   Component Value Ref Range Performed At Pathologist Signature   APTT 29 22 - 36 sec [facility]       APTT (04/26/2019 3:25 AM EDT)   Specimen   Blood - Blood     APTT (04/26/2019 3:25 AM EDT)   Narrative Performed At   Therapeutic range for monitoring Heparin Therapy is 45-80 seconds (August, 2016).   [facility]       APTT (04/26/2019 3:25 AM EDT)   Performing Organization Address City/[facility] [place] Phone Number   [facility] 229 [phone]     Back to top of Lab Results       COMPREHENSIVE METABOLIC PANEL (01/95/0355 3:25 AM EDT)  COMPREHENSIVE METABOLIC PANEL (22/29/1150 3:25 AM EDT)   Component Value Ref Range Performed At Pathologist Signature   Potassium 3.5 3.5 - 5.5 mmol/L [facility]     Sodium 144 133 - 145 mmol/L [facility]     Chloride 108 98 - 110 mmol/L [facility]     Glucose 84 70 - 99 mg/dL [facility]     Calcium 9.0 8.4 - 10.5 mg/dL [facility]     Albumin 3.3 (L) 3.5 - 5.0 g/dL [facility]     SGPT (ALT) 34 5 - 40 U/L [facility]     SGOT (AST) 20 10 - 37 U/L [facility]     Bilirubin Total 0.6 0.2 - 1.2 mg/dL [facility]     Alkaline Phosphatase 108 25 - 115 U/L [facility]     BUN 17 6 - 22 mg/dL [facility]     CO2 25 20 - 32 mmol/L [facility]     Creatinine 0.3 (L) 0.5 - 1.2 mg/dL [facility]     eGFR  >60.0 >60.0 [facility]     eGFR Non African American >60.0 >60.0 [facility]     Globulin 2.3 2.0 - 4.0 g/dL [facility]     A/G Ratio 1.4 1.1 - 2.6 ratio [facility]     Total Protein 5.6 (L) 6.4 - 8.3 g/dL [facility]     Anion Gap 11.0 mmol/L [facility]       COMPREHENSIVE METABOLIC PANEL (58/06/1580 3:25 AM EDT)   Specimen   Blood - Blood     COMPREHENSIVE METABOLIC PANEL (05/65/4197 3:25 AM EDT)   Narrative Performed At   Estimated GFR results are reported in mL/min/1.73 sq.m. by the MDRD equation.    This eGFR is validated for stable chronic renal failure patients. This equation is unreliable in acute illness or patients with normal renal function.   [facility]       COMPREHENSIVE METABOLIC PANEL (48/25/2108 3:25 AM EDT)   Performing Organization Address City/State/Zipcode Phone Number   [employer]  [facility] [address] [place] [address] [phone]     Back to top of Lab Results       TROPONIN (04/25/2019 4:30 PM EDT)  TROPONIN (04/25/2019 4:30 PM EDT)   Component Value Ref Range Performed At Pathologist Signature   Troponin (T) Quantitative <0.01 0.00 - 0.01 ng/mL [facility]       TROPONIN (04/25/2019 4:30 PM EDT)   Specimen   Blood - Blood     TROPONIN (04/25/2019 4:30 PM EDT)   Narrative Performed At   The upper limit of normal for this assay is 0.01 ng/ml.    Troponin levels exceeding 0.01 ng/ml may represent myocardial injury.    Abnormal troponin levels may be due to:    1. Myocardial infarction or acute coronary syndrome in the appropriate clinical setting.  Detection of a rise and/or fall is essential for the diagnosis of acute myocardial infarction.   Serial testing at 3 hour intervals is recommended.    2. Myocardial injury associated with congestive heart failure, pulmonary embolism, or sepsis.   [facility]       TROPONIN (04/25/2019 4:30 PM EDT)   Performing Organization Address City/State/Zipcode Phone Number   [facility]  [place] [address] [phone]     Back to top of Lab Results       CBC WITH DIFFERENTIAL AUTO (04/25/2019 4:30 PM EDT)  CBC WITH DIFFERENTIAL AUTO (04/25/2019 4:30 PM EDT)   Component Value Ref Range Performed At Pathologist Signature   WBC x 10*3 6.4 4.0 - 11.0 K/uL [facility]     RBC x 10^6 4. 25 3.80 - 5.20 M/uL [facility]     HGB 12.6 11.7 - 15.5 g/dL [facility]     HCT 39.5 35.1 - 46.5 % [facility]     MCV 93 80 - 95 fL [facility]     MCH 30 26 - 34 pg [facility]     MCHC 32 31 - 36 g/dL [facility]     RDW 12.3 10.0 - 15.5 % [facility]     Platelet 730 133 - 671 K/uL [facility]     MPV 9.8 9.0 - 13.0 fL [facility]     Segmented Neutrophils 38 (L) 40 - 75 % [facility]     Lymphocytes 53 (H) 20 - 45 % [facility]     Monocytes 6 3 - 12 % [facility]     Eosinophil 3 0 - 6 % [facility]     Basophils 1 0 - 2 % [facility]     Absolute Neutrophils 2.4 1.8 - 7.7 K/uL [facility]     Absolute Lymphocytes 3.4 1.0 - 4.8 K/uL [facility]     Absolute Monocyte Count 0.4 0.1 - 1.0 K/uL [facility]     Absolute Eosinophil 0.2 0.0 - 0.5 K/uL [facility]     Absolute Basophil Count 0.0 0.0 - 0.2 K/uL [facility]       CBC WITH DIFFERENTIAL AUTO (04/25/2019 4:30 PM EDT) Specimen   Blood - Blood     CBC WITH DIFFERENTIAL AUTO (04/25/2019 4:30 PM EDT)   Performing Organization Address City/[facility] Phone Number   Marco Syd  [address] [facility] CenterNaper[facility] NetworksAd[facility] StudioEXB[facility] 229 [phone]     Back to top of Lab Results       LIPASE (04/25/2019 4:30 PM EDT)  LIPASE (04/25/2019 4:30 PM EDT)   Component Value Ref Range Performed At Pathologist Signature   LIPASE 24 7 - 60 U/L [facility]       LIPASE (04/25/2019 4:30 PM EDT)   Specimen   Blood - Blood     LIPASE (04/25/2019 4:30 PM EDT)   Performing Organization Address City/[facility] Facility[facility] Phone Number   Marco Syd  [phone] [address] [facility] CenterPlaceWise MediaB[facility] 229 [phone]     Back to top of Lab Results       HEPATIC FUNCTION PANEL (04/25/2019 4:30 PM EDT)  HEPATIC FUNCTION PANEL (04/25/2019 4:30 PM EDT)   Component Value Ref Range Performed At Pathologist Signature   Albumin 4.0 3.5 - 5.0 g/dL [facility]     Total Protein 6.9 6.4 - 8.3 g/dL [facility]     Globulin 2.9 2.0 - 4.0 g/dL [facility]     A/G Ratio 1.4 1.1 - 2.6 ratio [facility]     Bilirubin Total 0.4 0.2 - 1.2 mg/dL [facility]     Bilirubin Direct <0.2 0.0 - 0.3 mg/dL [facility]     SGOT (AST) 26 10 - 37 U/L [facility]     Alkaline Phosphatase 129 (H) 25 - 115 U/L [facility]     SGPT (ALT) 45 (H) 5 - 40 U/L [facility]       HEPATIC FUNCTION PANEL (04/25/2019 4:30 PM EDT)   Specimen   Blood - Blood     HEPATIC FUNCTION PANEL (04/25/2019 4:30 PM EDT)   Performing Organization Address City/[facility] Phone Number   Marco Syd  [address] [facility] Center[facility] NetworksAd[facility] [employer] 229 [phone]     Back to top of Lab Results       [address] [place] [address] (62/43/8493 4:30 PM EDT)  BASIC METABOLIC PANEL (94/41/8040 4:30 PM EDT)   Component Value Ref Range Performed At Pathologist Signature   Potassium 4.0 3.5 - 5.5 mmol/L [facility]     Sodium 143 133 - 145 mmol/L [facility]     Chloride 105 98 - 110 mmol/L [facility]     Glucose 94 70 - 99 mg/dL [facility]     Calcium 9.4 8.4 - 10.5 mg/dL [facility]     BUN 21 6 - 22 mg/dL [facility]     Creatinine 0.3 (L) 0.5 - 1.2 mg/dL [facility]     CO2 27 20 - 32 mmol/L [facility]     eGFR  >60.0 >60.0 [facility]     eGFR Non African American >60.0 >60.0 [facility]     Anion Gap 11.0 mmol/L [facility]       BASIC METABOLIC PANEL (23/49/2285 4:30 PM EDT)   Specimen   Blood - Blood     BASIC METABOLIC PANEL (92/02/3110 4:30 PM EDT)   Narrative Performed At   Estimated GFR results are reported in mL/min/1.73 sq.m. by the MDRD equation.    This eGFR is validated for stable chronic renal failure patients. This equation is unreliable in acute illness or patients with normal renal function.   [facility] [address] (33/54/4348 4:30 PM EDT)   Performing Organization Address City/State/Zipcode Phone Number   Mark Borges  [address] [place] [address] [phone]     Back to top of Lab Results       CBC WITH DIFFERENTIAL (04/25/2019 4:30 PM EDT)  CBC WITH DIFFERENTIAL (04/25/2019 4:30 PM EDT)   Specimen   Blood     CBC WITH DIFFERENTIAL (04/25/2019 4:30 PM EDT)   Narrative Performed At   The following orders were created for panel order CBC WITH DIFFERENTIAL.     Procedure                               Abnormality         Status                       ---------                               -----------         ------                       CBC WITH DIFFERENTIAL LWBP[[id number]]   Abnormal            Final result                     Please view results for these tests on the individual orders.   [facility]       CBC WITH DIFFERENTIAL (04/25/2019 4:30 PM EDT)   Performing Organization Address City/State/Zipcode Phone Number   Jessi Schroeder [phone]     Back to top of Lab Results      ECG Results  - documented in this encounter      EKG 12-LEAD (04/25/2019 4:23 PM EDT)  EKG 12-LEAD (04/25/2019 4:23 PM EDT)   Component Value Ref Range Performed At Pathologist Signature   Heart Rate 47 bpm [facility]AC[facility]ASTERVUE     RR Interval 1,264 ms [facility]     Atrial Rate 47 ms [facility]     P-R Interval 133 ms [facility]     P Duration 97 ms [facility]     P Horizontal Dysart 8 deg [facility]     P Front Dysart 56 deg [facility]ASTERVUE     Q Onset 507 ms [facility]ERV[facility]     QRSD Interval 100 ms [facility]     QT Interval 464 ms [facility]     QTcB 411 ms [facility]UE     QTcF 428 ms [facility]     QRS Horizontal Axis -14 deg [facility]     QRS Dysart 54 deg [facility]     I-40 Front Dysart 6 deg [facility]AC[facility]ASTERVUE     t-40 Horizontal Axis -22 deg [facility]     T-40 Front Dysart 61 deg [facility]AC[facility]ASTERVUE     T Horizontal Axis 32 deg [facility]     T Wave Dysart 46 deg [facility]     S-T Horizontal Axis 68 deg [facility]     S-T Front Dysart 56 deg [facility]AC[facility]ASTERVUE     Impression - OTHERWISE NORMAL ECG -   TRACEMASTERVUE     Impression SB-Sinus bradycardia-rate<50   TRACEMASTERVUE     Impression NSC-No significant change since prior tracing-   TRACEMASTERVUE             Weight Loss Metrics 4/29/2019 10/29/2018 10/29/2018 7/18/2018 7/18/2018 7/2/2018 6/26/2018   Pre op / Initial Wt - 216 - 216 - - -   Today's Wt 117 lb - 148 lb 3.2 oz - 187 lb - 207 lb   BMI 21.4 kg/m2 - 27.11 kg/m2 - 34.2 kg/m2 37.86 kg/m2 -   Ideal Body Wt - - - 125 - - -   Excess Body Wt - - - 91 - - -   Goal Wt - - - 143 - - -   Wt loss to date - 67.8 - 29 - - -   % Wt Loss - - - 0.4 - - -   80% EBW - - - 72.8 - - -       Body mass index is 21.4 kg/m².         Past Medical History:   Diagnosis Date    Asthma     exercised induced    Autoimmune disease ([facility] [address]     Rheumatoid arthrtis    Morbid obesity ([facility] [address]     Nausea & vomiting     Rheumatoid arthritis ([facility] [address] 2016    Sleep apnea     Uses CPAP       Past Surgical History:   Procedure Laterality Date    HX APPENDECTOMY      HX  SECTION      HX CHOLECYSTECTOMY      HX GASTRIC BYPASS  2018    HX HYSTERECTOMY  2014       Current Outpatient Medications   Medication Sig Dispense Refill    predniSONE (DELTASONE) 20 mg tablet Take 20 mg by mouth daily for 5 days. Indications: inflammation 5 Tab 0    omeprazole (PRILOSEC) 40 mg capsule Take 1 Cap by mouth daily for 5 days. Indications: gastroesophageal reflux disease, Stress Ulcer Prevention 5 Cap 0    cholecalciferol, vitamin D3, (VITAMIN D3 PO) Take  by mouth.  esomeprazole (NEXIUM) 20 mg capsule Take 20 mg by mouth daily.  pediatric multivitamins chewable tablet Take 1 Tab by mouth daily.  Calcium-Cholecalciferol, D3, 600 mg(1,500mg) -400 unit chew Take 1 Tab by mouth daily.  cyanocobalamin (VITAMIN B-12) 1,000 mcg sublingual tablet Take 1,000 mcg by mouth daily.  albuterol (PROVENTIL HFA, VENTOLIN HFA, PROAIR HFA) 90 mcg/actuation inhaler Take  inhaled by mouth.  ergocalciferol (ERGOCALCIFEROL) 50,000 unit capsule Take 50,000 Units by mouth every seven (7) days.  hydroxychloroquine (PLAQUENIL) 200 mg tablet TAKE 1 TABLET BY MOUTH TWICE A DAY WITH FOOD OR MILK  1    estradiol (ESTRACE) 2 mg tablet TAKE 1 TABLET BY MOUTH EVERY DAY  5       Allergies   Allergen Reactions    Promethazine Unknown (comments)     Other reaction(s): psychological reaction  Hallucinations       ROS:   Review of Systems   Constitutional: Positive for fever. Gastrointestinal: Positive for abdominal pain, diarrhea and nausea. Negative for constipation, heartburn and vomiting. Neurological: Positive for dizziness. All other systems reviewed and are negative.         Visit Vitals  /78 (BP 1 Location: Left arm, BP Patient Position: Sitting)   Pulse (!) 55   Temp 96.4 °F (35.8 °C) (Oral)   Ht 5' 2\" (1.575 m)   Wt 53.1 kg (117 lb)   SpO2 97%   BMI 21.40 kg/m²     Physical Exam   Constitutional: She is oriented to person, place, and time and well-developed, well-nourished, and in no distress. HENT:   Head: Normocephalic. Neck: Normal range of motion. Cardiovascular: Normal rate and regular rhythm. Pulmonary/Chest: Effort normal and breath sounds normal. She exhibits tenderness. She exhibits no mass, no edema, no deformity and no swelling. Abdominal: Soft. She exhibits no distension and no mass. There is tenderness. There is no rebound and no guarding. Musculoskeletal: Normal range of motion. Neurological: She is alert and oriented to person, place, and time. Skin: Skin is warm and dry. Psychiatric: Affect normal.       Assessment/Plan:   After a thorough review of available medical records, including notes, procedure notes, imaging impressions, and labs with Dr. Hunter Beck as well as exam findings, subsequently he also examined the patient with me. Reviewed possible diagnosis as well as treatment plans with pt. Will trial 5 days po steroid to eval for improvement of lower rib pain vs other       Cause-- if no improvement further eval will be needed. Orders Placed This Encounter    predniSONE (DELTASONE) 20 mg tablet     Sig: Take 20 mg by mouth daily for 5 days. Indications: inflammation     Dispense:  5 Tab     Refill:  0    omeprazole (PRILOSEC) 40 mg capsule     Sig: Take 1 Cap by mouth daily for 5 days.  Indications: gastroesophageal reflux disease, Stress Ulcer Prevention     Dispense:  5 Cap     Refill:  0       Follow up Friday for re check     JOHN Joseph-BC

## 2019-04-29 NOTE — PROGRESS NOTES
Sami Miranda is a 45 y.o. female (: 1975) presenting to address:    Chief Complaint   Patient presents with    Weight Management    Nausea    Abdominal Pain       Medication list and allergies have been reviewed with Sami Ruben and updated as of today's date. I have gone over all Medical, Surgical and Social History with Arderubén Miranda and updated/added the information accordingly. 1. Have you been to the ER, Urgent Care or Hospitalized since your last visit? YES- Michael       2. Have you followed up with your PCP or any other Physicians since your procedure/ last office visit?    NO

## 2019-05-03 ENCOUNTER — OFFICE VISIT (OUTPATIENT)
Dept: SURGERY | Age: 44
End: 2019-05-03

## 2019-05-03 VITALS
OXYGEN SATURATION: 97 % | BODY MASS INDEX: 21.71 KG/M2 | DIASTOLIC BLOOD PRESSURE: 65 MMHG | SYSTOLIC BLOOD PRESSURE: 96 MMHG | RESPIRATION RATE: 17 BRPM | WEIGHT: 118 LBS | HEIGHT: 62 IN | HEART RATE: 61 BPM | TEMPERATURE: 98 F

## 2019-05-03 DIAGNOSIS — K91.2 POST-RESECTION MALABSORPTION: Primary | ICD-10-CM

## 2019-05-03 DIAGNOSIS — R10.11 RIGHT UPPER QUADRANT ABDOMINAL PAIN: ICD-10-CM

## 2019-05-03 DIAGNOSIS — Z98.84 S/P GASTRIC BYPASS: ICD-10-CM

## 2019-05-03 RX ORDER — OMEPRAZOLE 40 MG/1
40 CAPSULE, DELAYED RELEASE ORAL DAILY
Qty: 30 CAP | Refills: 0 | Status: SHIPPED | OUTPATIENT
Start: 2019-05-03 | End: 2019-05-03 | Stop reason: CLARIF

## 2019-05-03 RX ORDER — OMEPRAZOLE 40 MG/1
40 CAPSULE, DELAYED RELEASE ORAL DAILY
Qty: 30 CAP | Refills: 0 | Status: SHIPPED | OUTPATIENT
Start: 2019-05-03 | End: 2019-06-02

## 2019-05-03 NOTE — PROGRESS NOTES
Pt ID confirmed    Weight Loss Metrics 5/3/2019 5/3/2019 4/29/2019 10/29/2018 10/29/2018 7/18/2018 7/18/2018   Pre op / Initial Wt 216 - - 216 - 216 -   Today's Wt - 118 lb 117 lb - 148 lb 3.2 oz - 187 lb   BMI - 21.58 kg/m2 21.4 kg/m2 - 27.11 kg/m2 - 34.2 kg/m2   Ideal Body Wt 125 - - - - 125 -   Excess Body Wt 91 - - - - 91 -   Goal Wt 144 - - - - 143 -   Wt loss to date 98 - - 67.8 - 29 -   % Wt Loss 1.35 - - - - 0.4 -   80% EBW 72.8 - - - - 72.8 -       Body mass index is 21.58 kg/m².     Post op medications:  MVI: bid  Calcium Citrate: 1500 mg daily  Actigal: 0  B-12: 1000 mcg daily  Vitamin D3: 5,000 units daily

## 2019-05-06 NOTE — PROGRESS NOTES
Patient presents today for a 1 week follow up from ongoing right sided abdominal pain. She is finishing trial 5 days po steroid with PPI and notes a great improvement in s/s. She started to see initial resolution of s/s on Wednesday. She has had some lightheadedness but it resolved with PO fluid intake. Currently on a stage 4 diet, denies dietary indiscretions. Taking in >64 oz water,  60 -80 g protein. 60- 90 min of activity 3-4 days a week. Bowel movements are regular. She is compliant with multivitamins, calcium, Vit D and B12 supplements. Denies NSAID, ETOH, caffeine, and tobacco use. Weight Loss Metrics 5/3/2019 5/3/2019 2019 10/29/2018 10/29/2018 2018 2018   Pre op / Initial Wt 216 - - 216 - 216 -   Today's Wt - 118 lb 117 lb - 148 lb 3.2 oz - 187 lb   BMI - 21.58 kg/m2 21.4 kg/m2 - 27.11 kg/m2 - 34.2 kg/m2   Ideal Body Wt 125 - - - - 125 -   Excess Body Wt 91 - - - - 91 -   Goal Wt 144 - - - - 143 -   Wt loss to date 98 - - 67.8 - 29 -   % Wt Loss 1.35 - - - - 0.4 -   80% EBW 72.8 - - - - 72.8 -     Body mass index is 21.58 kg/m². Past Medical History:   Diagnosis Date    Asthma     exercised induced    Autoimmune disease ([address]     Rheumatoid arthrtis    Morbid obesity ([address]     Nausea & vomiting     Rheumatoid arthritis ([address] 2016    Sleep apnea     Uses CPAP       Past Surgical History:   Procedure Laterality Date    HX APPENDECTOMY      HX  SECTION      HX CHOLECYSTECTOMY      HX GASTRIC BYPASS  2018    HX HYSTERECTOMY  2014       Current Outpatient Medications   Medication Sig Dispense Refill    omeprazole (PRILOSEC) 40 mg capsule Take 1 Cap by mouth daily for 30 days. Indications: gastroesophageal reflux disease, Stress Ulcer Prevention 30 Cap 0    cholecalciferol, vitamin D3, (VITAMIN D3 PO) Take  by mouth.  pediatric multivitamins chewable tablet Take 1 Tab by mouth daily.       Calcium-Cholecalciferol, D3, 600 mg(1,500mg) -400 unit chew Take 1 Tab by mouth daily.  cyanocobalamin (VITAMIN B-12) 1,000 mcg sublingual tablet Take 1,000 mcg by mouth daily.  albuterol (PROVENTIL HFA, VENTOLIN HFA, PROAIR HFA) 90 mcg/actuation inhaler Take  inhaled by mouth.  ergocalciferol (ERGOCALCIFEROL) 50,000 unit capsule Take 50,000 Units by mouth every seven (7) days.  estradiol (ESTRACE) 2 mg tablet TAKE 1 TABLET BY MOUTH EVERY DAY  5       Allergies   Allergen Reactions    Promethazine Unknown (comments)     Other reaction(s): psychological reaction  Hallucinations       ROS:   Review of Systems   Constitutional: Negative. HENT: Negative. Eyes: Negative. Respiratory: Negative. Cardiovascular: Negative. Gastrointestinal: Positive for abdominal pain. Negative for constipation, diarrhea, heartburn, nausea and vomiting. Right side pain on palp only 0/10 at rest    Skin: Negative. Neurological:        Lightheaded    All other systems reviewed and are negative. Visit Vitals  BP 96/65   Pulse 61   Temp 98 °F (36.7 °C) (Oral)   Resp 17   Ht 5' 2\" (1.575 m)   Wt 53.5 kg (118 lb)   SpO2 97%   BMI 21.58 kg/m²     Physical Exam   Constitutional: She is oriented to person, place, and time and well-developed, well-nourished, and in no distress. HENT:   Head: Normocephalic. Neck: Normal range of motion. Cardiovascular: Normal rate and regular rhythm. Pulmonary/Chest: Effort normal and breath sounds normal. She exhibits tenderness. She exhibits no mass, no edema, no deformity and no swelling. Abdominal: Soft. She exhibits no distension and no mass. There is tenderness. There is no rebound and no guarding. Musculoskeletal: Normal range of motion. Neurological: She is alert and oriented to person, place, and time. Skin: Skin is warm and dry. Psychiatric: Affect normal.   Nursing note and vitals reviewed.     Assessment/Plan:   Continue PPI, monitor for continued s/s improvement, follow up 1 week or sooner if s/s fail to improve/worsen/or new s/s develop. Orders Placed This Encounter    DISCONTD: omeprazole (PRILOSEC) 40 mg capsule     Sig: Take 1 Cap by mouth daily for 30 days. Indications: gastroesophageal reflux disease, Stress Ulcer Prevention     Dispense:  30 Cap     Refill:  0    omeprazole (PRILOSEC) 40 mg capsule     Sig: Take 1 Cap by mouth daily for 30 days.  Indications: gastroesophageal reflux disease, Stress Ulcer Prevention     Dispense:  30 Cap     Refill:  0       Follow up 1 week for re check     ANURAG Mclaughlin

## 2019-05-09 ENCOUNTER — OFFICE VISIT (OUTPATIENT)
Dept: SURGERY | Age: 44
End: 2019-05-09

## 2019-05-09 VITALS
TEMPERATURE: 97.2 F | SYSTOLIC BLOOD PRESSURE: 97 MMHG | HEART RATE: 63 BPM | BODY MASS INDEX: 21.35 KG/M2 | DIASTOLIC BLOOD PRESSURE: 56 MMHG | WEIGHT: 116 LBS | HEIGHT: 62 IN | OXYGEN SATURATION: 100 %

## 2019-05-09 DIAGNOSIS — K91.2 POST-RESECTION MALABSORPTION: Primary | ICD-10-CM

## 2019-05-09 DIAGNOSIS — Z98.84 S/P GASTRIC BYPASS: ICD-10-CM

## 2019-05-09 NOTE — PROGRESS NOTES
Patient presents today for a 2 week follow up from ongoing right sided abdominal pain. Reports all s/s resolved and she is pain free. She has felt well enough to attend a physical activity class as well as complete all her ADLs as desired. Currently on a stage 4 diet, denies dietary indiscretions. Taking in >64 oz water,  60 -80 g protein. 60- 90 min of activity 3-4 days a week. Bowel movements are regular. She is compliant with multivitamins, calcium, Vit D and B12 supplements. Denies NSAID, ETOH, caffeine, and tobacco use. Weight Loss Metrics 5/3/2019 5/3/2019 2019 10/29/2018 10/29/2018 2018 2018   Pre op / Initial Wt 216 - - 216 - 216 -   Today's Wt - 118 lb 117 lb - 148 lb 3.2 oz - 187 lb   BMI - 21.58 kg/m2 21.4 kg/m2 - 27.11 kg/m2 - 34.2 kg/m2   Ideal Body Wt 125 - - - - 125 -   Excess Body Wt 91 - - - - 91 -   Goal Wt 144 - - - - 143 -   Wt loss to date 98 - - 67.8 - 29 -   % Wt Loss 1.35 - - - - 0.4 -   80% EBW 72.8 - - - - 72.8 -     Body mass index is 21.58 kg/m². Past Medical History:   Diagnosis Date    Asthma     exercised induced    Autoimmune disease ([address]     Rheumatoid arthrtis    Morbid obesity ([address]     Nausea & vomiting     Rheumatoid arthritis ([address] 2016    Sleep apnea     Uses CPAP       Past Surgical History:   Procedure Laterality Date    HX APPENDECTOMY      HX  SECTION      HX CHOLECYSTECTOMY      HX GASTRIC BYPASS  2018    HX HYSTERECTOMY         Current Outpatient Medications   Medication Sig Dispense Refill    omeprazole (PRILOSEC) 40 mg capsule Take 1 Cap by mouth daily for 30 days. Indications: gastroesophageal reflux disease, Stress Ulcer Prevention 30 Cap 0    cholecalciferol, vitamin D3, (VITAMIN D3 PO) Take  by mouth.  pediatric multivitamins chewable tablet Take 1 Tab by mouth daily.  Calcium-Cholecalciferol, D3, 600 mg(1,500mg) -400 unit chew Take 1 Tab by mouth daily.       cyanocobalamin (VITAMIN B-12) 1,000 mcg sublingual tablet Take 1,000 mcg by mouth daily.  albuterol (PROVENTIL HFA, VENTOLIN HFA, PROAIR HFA) 90 mcg/actuation inhaler Take  inhaled by mouth.  ergocalciferol (ERGOCALCIFEROL) 50,000 unit capsule Take 50,000 Units by mouth every seven (7) days.  estradiol (ESTRACE) 2 mg tablet TAKE 1 TABLET BY MOUTH EVERY DAY  5       Allergies   Allergen Reactions    Promethazine Unknown (comments)     Other reaction(s): psychological reaction  Hallucinations       ROS:   Review of Systems   Constitutional: Negative. HENT: Negative. Eyes: Negative. Respiratory: Negative. Cardiovascular: Negative. Gastrointestinal: Negative for abdominal pain, constipation, diarrhea, heartburn, nausea and vomiting. Skin: Negative. Neurological: Negative. All other systems reviewed and are negative. Visit Vitals  BP 96/65   Pulse 61   Temp 98 °F (36.7 °C) (Oral)   Resp 17   Ht 5' 2\" (1.575 m)   Wt 53.5 kg (118 lb)   SpO2 97%   BMI 21.58 kg/m²     Physical Exam   Constitutional: She is oriented to person, place, and time and well-developed, well-nourished, and in no distress. HENT:   Head: Normocephalic. Neck: Normal range of motion. Cardiovascular: Normal rate and regular rhythm. Pulmonary/Chest: Effort normal and breath sounds normal. She exhibits no mass, no tenderness, no edema, no deformity and no swelling. Abdominal: Soft. Bowel sounds are normal. She exhibits no distension and no mass. There is no tenderness. There is no rebound and no guarding. Musculoskeletal: Normal range of motion. Neurological: She is alert and oriented to person, place, and time. Skin: Skin is warm and dry. Psychiatric: Affect normal.   Nursing note and vitals reviewed. Assessment/Plan:   Doing well, follow up PRN, and for yearly with labs.      Maria Ines Garrett, JOHN-BC

## 2019-06-03 ENCOUNTER — HOSPITAL ENCOUNTER (OUTPATIENT)
Dept: LAB | Age: 44
Discharge: HOME | End: 2019-06-03
Payer: COMMERCIAL

## 2019-06-03 ENCOUNTER — OFFICE VISIT (OUTPATIENT)
Dept: FAMILY MEDICINE CLINIC | Age: 44
End: 2019-06-03

## 2019-06-03 VITALS
TEMPERATURE: 97.4 F | OXYGEN SATURATION: 100 % | DIASTOLIC BLOOD PRESSURE: 60 MMHG | RESPIRATION RATE: 16 BRPM | WEIGHT: 112.8 LBS | HEART RATE: 61 BPM | HEIGHT: 63 IN | SYSTOLIC BLOOD PRESSURE: 97 MMHG | BODY MASS INDEX: 19.99 KG/M2

## 2019-06-03 DIAGNOSIS — J45.20 MILD INTERMITTENT ASTHMA WITHOUT COMPLICATION: ICD-10-CM

## 2019-06-03 DIAGNOSIS — K91.2 POST-RESECTION MALABSORPTION: ICD-10-CM

## 2019-06-03 DIAGNOSIS — R74.01 TRANSAMINITIS: ICD-10-CM

## 2019-06-03 DIAGNOSIS — Z00.00 ROUTINE PHYSICAL EXAMINATION: Primary | ICD-10-CM

## 2019-06-03 PROBLEM — E66.01 MORBID OBESITY (HCC): Status: RESOLVED | Noted: 2018-04-25 | Resolved: 2019-06-03

## 2019-06-03 LAB — TSH SERPL DL<=0.05 MIU/L-ACNC: 0.71 UIU/ML (ref 0.36–3.74)

## 2019-06-03 PROCEDURE — 84443 ASSAY THYROID STIM HORMONE: CPT

## 2019-06-03 PROCEDURE — 86803 HEPATITIS C AB TEST: CPT

## 2019-06-03 PROCEDURE — 36415 COLL VENOUS BLD VENIPUNCTURE: CPT

## 2019-06-03 NOTE — PROGRESS NOTES
Lesley Wyatt is a 45 y.o.  female and presents with New Patient       Subjective:    S/P GBS-doing very well with weight loss. Following diet. No abdominal pain or fevers or chills. Last thyroid check was over one year ago. No diarrhea. No constipation  Asthmastable rare rescue inhaler use. Transaminitis-mildreviewed with patient. No abdominal pain. No jaundice. Assessment/plan  Diagnoses and all orders for this visit:    1. Routine physical examination-done today with review of routine recommendations    2. Mild intermittent asthma without complication-stable. Monitor    3. Post-resection malabsorption-recheck thyroid. Following with bariatric surgery  -     TSH 3RD GENERATION; Future    4. Transaminitis-suspect this has been due to fatty liver in the past last check was normal.  However will rule out hepatitis C  -     HEPATITIS C AB; Future            ROS:  Constitutional: No recent weight change. No weakness/fatigue. No f/c. Skin: No rashes, change in nails/hair, itching   HENT: No HA, dizziness. No hearing loss/tinnitus. No nasal congestion/discharge. Eyes: No change in vision, double/blurred vision or eye pain/redness. Cardiovascular: No CP/palpitations. No ANDERSON/orthopnea/PND. Respiratory: No cough/sputum, dyspnea, wheezing. Gastointestinal: No dysphagia, reflux. No n/v. No constipation/diarrhea. No melena/rectal bleeding. Genitourinary: No dysuria, urinary hesitancy, nocturia, hematuria. No incontinence. Musculoskeletal: No joint pain/stiffness. No muscle pain/tenderness. Endo: No heat/cold intolerance, no polyuria/polydypsia. Heme: No h/o anemia. No easy bleeding/bruising. Allergy/Immunology: No seasonal rhinitis. Denies frequent colds, sinus/ear infections. Neurological: No seizures/numbness/weakness. No paresthesias. Psychiatric:  No depression, anxiety.      PMH:  Past Medical History:   Diagnosis Date    Asthma     exercised induced    [facility]     Rheumatoid arthrtis    Morbid obesity ([facility]     Morbid obesity ([facility] 2018    Nausea & vomiting     Rheumatoid arthritis ([facility] 2016    Sleep apnea     Uses CPAP       Patient Active Problem List   Diagnosis Code    Hypovitaminosis D E55.9    GIL on CPAP G47.33, Z99.89    Post-resection malabsorption K91.2    Mild intermittent asthma without complication P37.28       PSH:  Past Surgical History:   Procedure Laterality Date    HX APPENDECTOMY      HX  SECTION      HX CHOLECYSTECTOMY      HX GASTRIC BYPASS  2018    HX HYSTERECTOMY          SH:  Social History     Tobacco Use    Smoking status: Never Smoker    Smokeless tobacco: Never Used   Substance Use Topics    Alcohol use: No    Drug use: No       FH:  Family History   Problem Relation Age of Onset    Diabetes Mother     Arthritis-osteo Mother     Hypertension Mother     Diabetes Father     COPD Father        Medications/Allergies:    Current Outpatient Medications:     cholecalciferol, vitamin D3, (VITAMIN D3 PO), Take  by mouth. Bariatric Vitamin D daily, Disp: , Rfl:     pediatric multivitamins chewable tablet, Take 1 Tab by mouth daily. , Disp: , Rfl:     Calcium-Cholecalciferol, D3, 600 mg(1,500mg) -400 unit chew, Take 1 Tab by mouth daily. , Disp: , Rfl:     cyanocobalamin (VITAMIN B-12) 1,000 mcg sublingual tablet, Take 1,000 mcg by mouth daily. , Disp: , Rfl:     albuterol (PROVENTIL HFA, VENTOLIN HFA, PROAIR HFA) 90 mcg/actuation inhaler, Take  inhaled by mouth., Disp: , Rfl:     estradiol (ESTRACE) 2 mg tablet, TAKE 1 TABLET BY MOUTH EVERY DAY, Disp: , Rfl: 5    ergocalciferol (ERGOCALCIFEROL) 50,000 unit capsule, Take 50,000 Units by mouth every seven (7) days. , Disp: , Rfl:     Allergies   Allergen Reactions    Promethazine Unknown (comments)     Other reaction(s): psychological reaction  Hallucinations       Objective:  Visit Vitals  BP 97/60   Pulse 61   Temp 97.4 °F (36.3 °C) (Oral)   Resp 16   Ht 5' 3\" (1.6 m)   Wt 112 lb 12.8 oz (51.2 kg)   SpO2 100%   BMI 19.98 kg/m²    Body mass index is 19.98 kg/m². Constitutional: Well developed, nourished, no distress, alert, normal habitus   HENT: Exterior ears and tympanic membranes normal bilaterally. Supple neck. No thyromegaly or lymphadenopathy. Oropharynx clear and moist mucous membranes. Eyes: Conjunctiva normal. PERRL. Cardiovascular: S1, S2.  RRR. No murmurs/rubs. No thrills palpated. No carotid bruits. Intact distal pulses. No edema. Pulmonary/Chest Wall: No abnormalities on inspection. Clear to auscultation bilaterally. No wheezing/rhonchi. Normal effort. GI: Soft, nontender, nondistended. Normal active bowel sounds. No  masses on palpation. No hepatosplenomegaly. Musculoskeletal: Gait normal.  Joints without deformity/tenderness. Strength intact bilateral upper and lower ext. Normal ROM all extremities. Neurological: Appropriate. 2+DTR. No focal motor or sensory deficits. Speech normal.   Skin: No lesions/rashes on inspection. Psych: Appropriate affect, judgement and insight. Short-term memory intact.              Health Maintenance:   Health Maintenance   Topic Date Due    DTaP/Tdap/Td series (1 - Tdap) 01/24/1998    Influenza Age 5 to Adult  08/01/2019    PAP AKA CERVICAL CYTOLOGY  10/01/2021    Pneumococcal 0-64 years  Aged Out       Orders Placed This Encounter    HEPATITIS C AB     Standing Status:   Future     Number of Occurrences:   1     Standing Expiration Date:   6/3/2020    TSH 3RD GENERATION     Standing Status:   Future     Number of Occurrences:   1     Standing Expiration Date:   6/3/2020

## 2019-06-03 NOTE — PATIENT INSTRUCTIONS
Please check with pt first about your last tetanus booster and was it a TD or TDaP? Well Visit, Ages 25 to 48: Care Instructions  Your Care Instructions    Physical exams can help you stay healthy. Your doctor has checked your overall health and may have suggested ways to take good care of yourself. He or she also may have recommended tests. At home, you can help prevent illness with healthy eating, regular exercise, and other steps. Follow-up care is a key part of your treatment and safety. Be sure to make and go to all appointments, and call your doctor if you are having problems. It's also a good idea to know your test results and keep a list of the medicines you take. How can you care for yourself at home? · Reach and stay at a healthy weight. This will lower your risk for many problems, such as obesity, diabetes, heart disease, and high blood pressure. · Get at least 30 minutes of physical activity on most days of the week. Walking is a good choice. You also may want to do other activities, such as running, swimming, cycling, or playing tennis or team sports. Discuss any changes in your exercise program with your doctor. · Do not smoke or allow others to smoke around you. If you need help quitting, talk to your doctor about stop-smoking programs and medicines. These can increase your chances of quitting for good. · Talk to your doctor about whether you have any risk factors for sexually transmitted infections (STIs). Having one sex partner (who does not have STIs and does not have sex with anyone else) is a good way to avoid these infections. · Use birth control if you do not want to have children at this time. Talk with your doctor about the choices available and what might be best for you. · Protect your skin from too much sun. When you're outdoors from 10 a.m. to 4 p.m., stay in the shade or cover up with clothing and a hat with a wide brim. Wear sunglasses that block UV rays.  Even when it's cloudy, put broad-spectrum sunscreen (SPF 30 or higher) on any exposed skin. · See a dentist one or two times a year for checkups and to have your teeth cleaned. · Wear a seat belt in the car. · Drink alcohol in moderation, if at all. That means no more than 2 drinks a day for men and 1 drink a day for women. Follow your doctor's advice about when to have certain tests. These tests can spot problems early. For everyone  · Cholesterol. Have the fat (cholesterol) in your blood tested after age 21. Your doctor will tell you how often to have this done based on your age, family history, or other things that can increase your risk for heart disease. · Blood pressure. Have your blood pressure checked during a routine doctor visit. Your doctor will tell you how often to check your blood pressure based on your age, your blood pressure results, and other factors. · Vision. Talk with your doctor about how often to have a glaucoma test.  · Diabetes. Ask your doctor whether you should have tests for diabetes. · Colon cancer. Have a test for colon cancer at age 48. You may have one of several tests. If you are younger than 48, you may need a test earlier if you have any risk factors. Risk factors include whether you already had a precancerous polyp removed from your colon or whether your parent, brother, sister, or child has had colon cancer. For women  · Breast exam and mammogram. Talk to your doctor about when you should have a clinical breast exam and a mammogram. Medical experts differ on whether and how often women under 50 should have these tests. Your doctor can help you decide what is right for you. · Pap test and pelvic exam. Begin Pap tests at age 24. A Pap test is the best way to find cervical cancer. The test often is part of a pelvic exam. Ask how often to have this test.  · Tests for sexually transmitted infections (STIs). Ask whether you should have tests for STIs.  You may be at risk if you have sex with more than one person, especially if your partners do not wear condoms. For men  · Tests for sexually transmitted infections (STIs). Ask whether you should have tests for STIs. You may be at risk if you have sex with more than one person, especially if you do not wear a condom. · Testicular cancer exam. Ask your doctor whether you should check your testicles regularly. · Prostate exam. Talk to your doctor about whether you should have a blood test (called a PSA test) for prostate cancer. Experts differ on whether and when men should have this test. Some experts suggest it if you are older than 39 and are -American or have a father or brother who got prostate cancer when he was younger than 72. When should you call for help? Watch closely for changes in your health, and be sure to contact your doctor if you have any problems or symptoms that concern you. Where can you learn more? Go to http://amrita-moira.info/. Enter P072 in the search box to learn more about \"Well Visit, Ages 25 to 48: Care Instructions. \"  Current as of: March 28, 2018  Content Version: 11.9  © 6034-3853 [employer]. Care instructions adapted under license by [employer] (which disclaims liability or warranty for this information). If you have questions about a medical condition or this instruction, always ask your healthcare professional. [address] any warranty or liability for your use of this information.

## 2019-06-04 LAB
HCV AB SER IA-ACNC: <0.02 INDEX
HCV AB SERPL QL IA: NEGATIVE
HCV COMMENT,HCGAC: NORMAL

## 2019-07-02 ENCOUNTER — OFFICE VISIT (OUTPATIENT)
Dept: SURGERY | Age: 44
End: 2019-07-02

## 2019-07-02 ENCOUNTER — HOSPITAL ENCOUNTER (OUTPATIENT)
Dept: LAB | Age: 44
Discharge: HOME | End: 2019-07-02
Payer: COMMERCIAL

## 2019-07-02 VITALS
RESPIRATION RATE: 20 BRPM | TEMPERATURE: 96.6 F | HEART RATE: 62 BPM | WEIGHT: 111 LBS | HEIGHT: 63 IN | DIASTOLIC BLOOD PRESSURE: 60 MMHG | BODY MASS INDEX: 19.67 KG/M2 | SYSTOLIC BLOOD PRESSURE: 93 MMHG

## 2019-07-02 DIAGNOSIS — K91.2 POST-RESECTION MALABSORPTION: Primary | ICD-10-CM

## 2019-07-02 DIAGNOSIS — Z98.84 HISTORY OF ROUX-EN-Y GASTRIC BYPASS: ICD-10-CM

## 2019-07-02 LAB
ALBUMIN SERPL-MCNC: 3.7 G/DL (ref 3.4–5)
ALBUMIN SERPL-MCNC: 3.9 G/DL (ref 3.4–5)
ALBUMIN/GLOB SERPL: 1.2 {RATIO} (ref 0.8–1.7)
ALP SERPL-CCNC: 110 U/L (ref 45–117)
ALT SERPL-CCNC: 64 U/L (ref 13–56)
ANION GAP SERPL CALC-SCNC: 5 MMOL/L (ref 3–18)
AST SERPL-CCNC: 34 U/L (ref 15–37)
BASOPHILS # BLD: 0 K/UL (ref 0–0.1)
BASOPHILS NFR BLD: 0 % (ref 0–2)
BILIRUB SERPL-MCNC: 0.7 MG/DL (ref 0.2–1)
BUN SERPL-MCNC: 19 MG/DL (ref 7–18)
BUN/CREAT SERPL: 38 (ref 12–20)
CALCIUM SERPL-MCNC: 8.6 MG/DL (ref 8.5–10.1)
CHLORIDE SERPL-SCNC: 105 MMOL/L (ref 100–108)
CO2 SERPL-SCNC: 30 MMOL/L (ref 21–32)
CREAT SERPL-MCNC: 0.5 MG/DL (ref 0.6–1.3)
DIFFERENTIAL METHOD BLD: ABNORMAL
EOSINOPHIL # BLD: 0.1 K/UL (ref 0–0.4)
EOSINOPHIL NFR BLD: 2 % (ref 0–5)
ERYTHROCYTE [DISTWIDTH] IN BLOOD BY AUTOMATED COUNT: 12.4 % (ref 11.6–14.5)
FERRITIN SERPL-MCNC: 83 NG/ML (ref 8–388)
FOLATE SERPL-MCNC: 15.6 NG/ML (ref 3.1–17.5)
GLOBULIN SER CALC-MCNC: 3 G/DL (ref 2–4)
GLUCOSE SERPL-MCNC: 79 MG/DL (ref 74–99)
HCT VFR BLD AUTO: 40 % (ref 35–45)
HGB BLD-MCNC: 13.1 G/DL (ref 12–16)
IRON SERPL-MCNC: 68 UG/DL (ref 50–175)
LYMPHOCYTES # BLD: 2.5 K/UL (ref 0.9–3.6)
LYMPHOCYTES NFR BLD: 54 % (ref 21–52)
MCH RBC QN AUTO: 29.6 PG (ref 24–34)
MCHC RBC AUTO-ENTMCNC: 32.8 G/DL (ref 31–37)
MCV RBC AUTO: 90.5 FL (ref 74–97)
MONOCYTES # BLD: 0.3 K/UL (ref 0.05–1.2)
MONOCYTES NFR BLD: 7 % (ref 3–10)
NEUTS SEG # BLD: 1.7 K/UL (ref 1.8–8)
NEUTS SEG NFR BLD: 37 % (ref 40–73)
PLATELET # BLD AUTO: 214 K/UL (ref 135–420)
PMV BLD AUTO: 10.4 FL (ref 9.2–11.8)
POTASSIUM SERPL-SCNC: 3.8 MMOL/L (ref 3.5–5.5)
PROT SERPL-MCNC: 6.7 G/DL (ref 6.4–8.2)
RBC # BLD AUTO: 4.42 M/UL (ref 4.2–5.3)
SODIUM SERPL-SCNC: 140 MMOL/L (ref 136–145)
VIT B12 SERPL-MCNC: 476 PG/ML (ref 211–911)
WBC # BLD AUTO: 4.6 K/UL (ref 4.6–13.2)

## 2019-07-02 PROCEDURE — 84425 ASSAY OF VITAMIN B-1: CPT

## 2019-07-02 PROCEDURE — 82728 ASSAY OF FERRITIN: CPT

## 2019-07-02 PROCEDURE — 36415 COLL VENOUS BLD VENIPUNCTURE: CPT

## 2019-07-02 PROCEDURE — 82306 VITAMIN D 25 HYDROXY: CPT

## 2019-07-02 PROCEDURE — 83540 ASSAY OF IRON: CPT

## 2019-07-02 PROCEDURE — 82607 VITAMIN B-12: CPT

## 2019-07-02 PROCEDURE — 85025 COMPLETE CBC W/AUTO DIFF WBC: CPT

## 2019-07-02 PROCEDURE — 80053 COMPREHEN METABOLIC PANEL: CPT

## 2019-07-02 PROCEDURE — 82040 ASSAY OF SERUM ALBUMIN: CPT

## 2019-07-02 NOTE — PATIENT INSTRUCTIONS
If you have any questions or concerns about today's appointment, the verbal and/or written instructions you were given for follow up care, please call our office at [phone]. [address] [facility] - [address]    [phone] office  [phone]

## 2019-07-02 NOTE — PROGRESS NOTES
Jose Shore is a 45 y.o. female is now 1 years status post laparoscopic gastric bypass surgery. Doing well overall. Currently on a bariatric diet without difficulty. Taking in 64-76 oz water, 80+ g protein. 45-60 min of activity 6-7 days a week. Bowel movements are regular. The patient is not having any pain. . She is compliant with multivitamins, calcium, Vit D and B12 supplements. Weight Loss Metrics 2019 2019 6/3/2019 2019 5/3/2019 5/3/2019 2019   Pre op / Initial Wt 216 - - - 216 - -   Today's Wt - 111 lb 112 lb 12.8 oz 116 lb - 118 lb 117 lb   BMI - 19.66 kg/m2 19.98 kg/m2 21.22 kg/m2 - 21.58 kg/m2 21.4 kg/m2   Ideal Body Wt 125 - - - 125 - -   Excess Body Wt 91 - - - 91 - -   Goal Wt 143 - - - 144 - -   Wt loss to date 105 - - - 98 - -   % Wt Loss 1.44 - - - 1.35 - -   80% EBW 72.8 - - - 72.8 - -       Body mass index is 19.66 kg/m². Comorbidities:    Hypertension: not applicable  Diabetes: not applicable  Obstructive Sleep Apnea: resolved  Hyperlipidemia: not applicable  Stress Urinary Incontinence: not applicable  Gastroesophageal Reflux: resolved  Weight related arthropathy:improved        Past Medical History:   Diagnosis Date    Asthma     exercised induced    Autoimmune disease ([address]     Rheumatoid arthrtis    [facility]     Morbid obesity [facility] 2018    Nausea & vomiting     Rheumatoid arthritis ([facility] [address] [facility] 2016    Sleep apnea     Uses CPAP       Past Surgical History:   Procedure Laterality Date    HX APPENDECTOMY      HX  SECTION      HX CHOLECYSTECTOMY      HX GASTRIC BYPASS  2018    HX HYSTERECTOMY  2014       Current Outpatient Medications   Medication Sig Dispense Refill    cholecalciferol, vitamin D3, (VITAMIN D3 PO) Take  by mouth. Bariatric Vitamin D daily      pediatric multivitamins chewable tablet Take 1 Tab by mouth daily.       Calcium-Cholecalciferol, D3, 600 mg(1,500mg) -400 unit chew Take 1 Tab by mouth daily.      cyanocobalamin (VITAMIN B-12) 1,000 mcg sublingual tablet Take 1,000 mcg by mouth daily.  albuterol (PROVENTIL HFA, VENTOLIN HFA, PROAIR HFA) 90 mcg/actuation inhaler Take  inhaled by mouth.  estradiol (ESTRACE) 2 mg tablet TAKE 1 TABLET BY MOUTH EVERY DAY  5    ergocalciferol (ERGOCALCIFEROL) 50,000 unit capsule Take 50,000 Units by mouth every seven (7) days. Allergies   Allergen Reactions    Promethazine Unknown (comments)     Other reaction(s): psychological reaction  Hallucinations       ROS:  Positive in BOLD  CONST: Fever, weight loss, fatigue or chills  GI: Nausea, vomiting, abdominal pain, change in bowel habits, hematochezia, melena, and GERD   INTEG: Dermatitis, abnormal moles  HEENT: Recent changes in vision, vertigo, epistaxis, dysphagia and hoarseness  CV: Chest pain, palpitations, HTN, edema and varicosities  RESP: Cough, shortness of breath, wheezing, hemoptysis, snoring and reactive airway disease  : Hematuria, dysuria, frequency, urgency, nocturia and stress urinary incontinence   MS: Weakness, joint pain and arthritis  ENDO: Diabetes, thyroid disease, polyuria, polydipsia, polyphagia, poor wound healing, heat intolerance, cold intolerance  LYMPH/HEME: Anemia, bruising and history of blood transfusions  NEURO: Dizziness, headache, fainting, seizures and stroke  PSYCH: Anxiety and depression      Physicial Exam:  Visit Vitals  BP 93/60 (BP 1 Location: Left arm, BP Patient Position: At rest)   Pulse 62   Temp 96.6 °F (35.9 °C) (Oral)   Resp 20   Ht 5' 3\" (1.6 m)   Wt 50.3 kg (111 lb)   BMI 19.66 kg/m²     General: 45 y.o.) female in no acute distress. HEENT: Normocephalic, atraumatic, Pupils equal and reactive, nasopharynx clear, oropharynx clear and moist without lesions  NECK: Supple, no lymphadenopathy, thyromegaly, carotid bruits or jugular venous distension.  trachea midline  RESP: Clear to auscultation bilaterally, no wheezes, rhonchi, or rales, normal respiratory excursion  CV: Regular rate and rhythm, no murmurs, rubs or gallops. 3+/4 pulses in bilateral dorsalis pedis and posterior tibialis. No distal edema or varicosities. ABD: Soft, nontender, nondistended, normoactive bowel sounds, no hernias, no hepatosplenomegaly  Extremities: Warm, well perfused, no tenderness or swelling, normal gait/station  Neuro: Sensation and strength grossly intact and symmetrical  Psych: Alert and oriented to person, place, and time. Labs:  Pending     Assessment/Plan: Pt is currently 1 year s/p laparoscopic gastric bypass surgery with a total weight loss of 105 lbs to date, doing well   Labs were ordered and patient instructed to obtain asap   Stressed importance of hydration with SF clear liquids until urine clear. OK to continue current diet, with food content mainly meats/veggies.   Encouraged support group attendance  Advised to continue current exercise regimen         FAVIOLA RaviP-BC

## 2019-07-03 LAB — 25(OH)D3 SERPL-MCNC: 33.2 NG/ML (ref 30–100)

## 2019-07-07 LAB — VIT B1 BLD-SCNC: 122.3 NMOL/L (ref 66.5–200)

## 2019-11-25 ENCOUNTER — TELEPHONE (OUTPATIENT)
Dept: SURGERY | Age: 44
End: 2019-11-25

## 2019-11-25 NOTE — TELEPHONE ENCOUNTER
Patient called and stated that she had GBP on July 2nd 2018 and that [facility]. Stated that they gave her Augmentin and a Flonase nasal spray and wanted to make sure they can take them both. Notified patient that they would be fine for her to take. Patient gave verbal understanding. Closing encounter.

## 2020-02-24 ENCOUNTER — HOSPITAL ENCOUNTER (OUTPATIENT)
Dept: LAB | Age: 45
Discharge: HOME | End: 2020-02-24
Payer: COMMERCIAL

## 2020-02-24 ENCOUNTER — OFFICE VISIT (OUTPATIENT)
Dept: FAMILY MEDICINE CLINIC | Age: 45
End: 2020-02-24

## 2020-02-24 VITALS
HEART RATE: 67 BPM | HEIGHT: 63 IN | OXYGEN SATURATION: 95 % | SYSTOLIC BLOOD PRESSURE: 103 MMHG | DIASTOLIC BLOOD PRESSURE: 61 MMHG | TEMPERATURE: 96.7 F | WEIGHT: 118.2 LBS | RESPIRATION RATE: 16 BRPM | BODY MASS INDEX: 20.94 KG/M2

## 2020-02-24 DIAGNOSIS — J45.20 MILD INTERMITTENT ASTHMA WITHOUT COMPLICATION: ICD-10-CM

## 2020-02-24 DIAGNOSIS — K91.2 POST-RESECTION MALABSORPTION: ICD-10-CM

## 2020-02-24 DIAGNOSIS — R74.01 TRANSAMINITIS: ICD-10-CM

## 2020-02-24 DIAGNOSIS — R74.01 TRANSAMINITIS: Primary | ICD-10-CM

## 2020-02-24 DIAGNOSIS — Z23 ENCOUNTER FOR IMMUNIZATION: ICD-10-CM

## 2020-02-24 LAB
ALBUMIN SERPL-MCNC: 3.9 G/DL (ref 3.4–5)
ALBUMIN/GLOB SERPL: 1.3 {RATIO} (ref 0.8–1.7)
ALP SERPL-CCNC: 108 U/L (ref 45–117)
ALT SERPL-CCNC: 57 U/L (ref 13–56)
ANION GAP SERPL CALC-SCNC: 4 MMOL/L (ref 3–18)
AST SERPL-CCNC: 36 U/L (ref 10–38)
BILIRUB SERPL-MCNC: 0.7 MG/DL (ref 0.2–1)
BUN SERPL-MCNC: 21 MG/DL (ref 7–18)
BUN/CREAT SERPL: 40 (ref 12–20)
CALCIUM SERPL-MCNC: 8.5 MG/DL (ref 8.5–10.1)
CHLORIDE SERPL-SCNC: 111 MMOL/L (ref 100–111)
CO2 SERPL-SCNC: 28 MMOL/L (ref 21–32)
CREAT SERPL-MCNC: 0.53 MG/DL (ref 0.6–1.3)
ERYTHROCYTE [DISTWIDTH] IN BLOOD BY AUTOMATED COUNT: 13 % (ref 11.6–14.5)
FERRITIN SERPL-MCNC: 56 NG/ML (ref 8–388)
GLOBULIN SER CALC-MCNC: 2.9 G/DL (ref 2–4)
GLUCOSE SERPL-MCNC: 80 MG/DL (ref 74–99)
HCT VFR BLD AUTO: 41.2 % (ref 35–45)
HGB BLD-MCNC: 13.7 G/DL (ref 12–16)
MCH RBC QN AUTO: 30.2 PG (ref 24–34)
MCHC RBC AUTO-ENTMCNC: 33.3 G/DL (ref 31–37)
MCV RBC AUTO: 90.9 FL (ref 74–97)
PLATELET # BLD AUTO: 245 K/UL (ref 135–420)
PMV BLD AUTO: 10.8 FL (ref 9.2–11.8)
POTASSIUM SERPL-SCNC: 4.1 MMOL/L (ref 3.5–5.5)
PROT SERPL-MCNC: 6.8 G/DL (ref 6.4–8.2)
RBC # BLD AUTO: 4.53 M/UL (ref 4.2–5.3)
SODIUM SERPL-SCNC: 143 MMOL/L (ref 136–145)
WBC # BLD AUTO: 7.3 K/UL (ref 4.6–13.2)

## 2020-02-24 PROCEDURE — 86225 DNA ANTIBODY NATIVE: CPT

## 2020-02-24 PROCEDURE — 82728 ASSAY OF FERRITIN: CPT

## 2020-02-24 PROCEDURE — 83516 IMMUNOASSAY NONANTIBODY: CPT

## 2020-02-24 PROCEDURE — 87340 HEPATITIS B SURFACE AG IA: CPT

## 2020-02-24 PROCEDURE — 80053 COMPREHEN METABOLIC PANEL: CPT

## 2020-02-24 PROCEDURE — 86706 HEP B SURFACE ANTIBODY: CPT

## 2020-02-24 PROCEDURE — 82390 ASSAY OF CERULOPLASMIN: CPT

## 2020-02-24 PROCEDURE — 36415 COLL VENOUS BLD VENIPUNCTURE: CPT

## 2020-02-24 PROCEDURE — 85027 COMPLETE CBC AUTOMATED: CPT

## 2020-02-24 PROCEDURE — 82103 ALPHA-1-ANTITRYPSIN TOTAL: CPT

## 2020-02-24 NOTE — PROGRESS NOTES
1. Have you been to the ER, urgent care clinic since your last visit? Hospitalized since your last visit? No.    2. Have you seen or consulted any other health care providers outside of the [address] [place] [address] since your last visit? Include any pap smears or colon screening. No.    Chief Complaint   Patient presents with    Follow Up Chronic Condition    Asthma    Other     Transiminitis, s/p gastric bypass. After obtaining consent, and per orders of Dr. Iron Simmons, injection of TDAP and Pneumovax 23 vaccines given by [facility] CMA. . Patient instructed to remain in clinic for 20 minutes afterwards, and to report any adverse reaction to me immediately.

## 2020-02-24 NOTE — PATIENT INSTRUCTIONS
Liver Function Tests: About These Tests  What is it? Liver function tests check how well your liver is working. Some tests measure the amount of certain enzymes in your blood to check whether the liver is damaged or inflamed. Other tests measure how well the liver can make important proteins or clear waste products from the body. Your doctor will use the test results to help look for certain conditions, such as hepatitis, cirrhosis, or gallbladder problems. Test results that are not normal do not always mean there is a problem with your liver. Your doctor can determine if there is a problem based on your results. The tests may include:  · Alkaline phosphatase (ALP). This test measures the amount of the enzyme ALP in your blood. · Total protein. A total serum protein test measures the amounts of two major groups of proteins in your blood (albumin and globulin) and the total amount of protein. · Bilirubin. This test measures the amount of bilirubin in your blood. When bilirubin levels are high, the skin and whites of the eyes may appear yellow (jaundice). This may be caused by liver disease. · Aspartate aminotransferase (AST) and alanine aminotransferase (ALT). These tests measure the amount of the enzymes AST and ALT in your blood. (Aminotransferase is also known as a transaminase. High levels may be called transaminitis.)  Why is this test done? Liver function tests check how well your liver is working. Some tests help show whether your liver is damaged or inflamed. Your doctor may order liver function tests if you have symptoms of liver disease. These tests also may be done to see how well a treatment for liver disease is working. How can you prepare for the test?  In general, you do not need to prepare before having these tests. Your doctor may give you some specific instructions to prepare. What happens during the test?  A health professional takes a sample of your blood.   What happens after the test?  You will probably be able to go home right away. When should you call for help? Watch closely for changes in your health, and be sure to contact your doctor if you have any problems. Follow-up care is a key part of your treatment and safety. Be sure to make and go to all appointments, and call your doctor if you are having problems. It's also a good idea to keep a list of the medicines you take. Ask your doctor when you can expect to have your test results. Where can you learn more? Go to http://amrita-moira.info/. Enter Z036 in the search box to learn more about \"Liver Function Tests: About These Tests. \"  Current as of: March 28, 2019  Content Version: 12.2  © 0834-6779 [employer]. Care instructions adapted under license by [employer] (which disclaims liability or warranty for this information). If you have questions about a medical condition or this instruction, always ask your healthcare professional. [address] any warranty or liability for your use of this information.

## 2020-02-24 NOTE — PROGRESS NOTES
Lalita Ramirez is a 39 y.o.  female and presents with Follow Up Chronic Condition; Asthma; and Other (Transiminitis, s/p gastric bypass.)       Subjective:    S/P GBS-doing very well with weight loss. Following diet. No abdominal pain or fevers or chills. Last thyroid check was over one year ago. No diarrhea. No constipation. Asthma-stable rare rescue inhaler use. Transaminitis-mild-reviewed with patient. No abdominal pain. No jaundice. Question of previous rheumatoid arthritis however laboratory testing was negative. Patient also reports that her joint pain improved significantly with weight loss. She does get monthly injections of Xolair for eczema from the dermatologist she reports        Assessment/plan  Diagnoses and all orders for this visit:    1. Transaminitis-possibly due to fatty liver however MRI did not show steatosis. Negative hep C also. Will recheck liver function tests and further evaluation for transaminitis on review of old labs from [facility] system today as her alkaline phosphatase has also been elevated will check an antimitochondrial antibody. 2. Mild intermittent asthma without complication-stable. No changes    3. Post-resection malabsorption-normal TSH- Following with bariatric surgery    4. Health maintenance-we will give Tdap and Pneumovax 23 after discussion today    Diagnoses and all orders for this visit:    1. Transaminitis  -     HEP B SURFACE AG; Future  -     HEP B SURFACE AB; Future  -     CERULOPLASMIN; Future  -     ALPHA-1-ANTITRYPSIN, TOTAL; Future  -     CONSTANTIN COMPREHENSIVE PANEL; Future  -     METABOLIC PANEL, COMPREHENSIVE; Future  -     CBC W/O DIFF; Future  -     MITOCHONDRIAL M2 AB; Future    2.  Encounter for immunization  -     TETANUS, DIPHTHERIA TOXOIDS AND ACELLULAR PERTUSSIS VACCINE (TDAP), IN INDIVIDS. >=7, IM  -     PNEUMOCOCCAL POLYSACCHARIDE VACCINE, 23-VALENT, ADULT OR IMMUNOSUPPRESSED PT DOSE,    3. Mild intermittent asthma without complication    4. Post-resection malabsorption          ROS:  Constitutional: No recent weight change. No weakness/fatigue. No f/c. Skin: No rashes, change in nails/hair, itching   HENT: No HA, dizziness. No hearing loss/tinnitus. No nasal congestion/discharge. Eyes: No change in vision, double/blurred vision or eye pain/redness. Cardiovascular: No CP/palpitations. No ANDERSON/orthopnea/PND. Respiratory: No cough/sputum, dyspnea, wheezing. Gastointestinal: No dysphagia, reflux. No n/v. No constipation/diarrhea. No melena/rectal bleeding. Genitourinary: No dysuria, urinary hesitancy, nocturia, hematuria. No incontinence. Musculoskeletal: No joint pain/stiffness. No muscle pain/tenderness. Endo: No heat/cold intolerance, no polyuria/polydypsia. Heme: No h/o anemia. No easy bleeding/bruising. Allergy/Immunology: No seasonal rhinitis. Denies frequent colds, sinus/ear infections. Neurological: No seizures/numbness/weakness. No paresthesias. Psychiatric:  No depression, anxiety.      PMH:  Past Medical History:   Diagnosis Date    Asthma     exercised induced    Autoimmune disease ([address]     Rheumatoid arthrtis    Morbid obesity ([address]     Morbid obesity ([address] 2018    Nausea & vomiting     Rheumatoid arthritis ([address] 2016    Sleep apnea     Uses CPAP       Patient Active Problem List   Diagnosis Code    Hypovitaminosis D E55.9    GIL on CPAP G47.33, Z99.89    Post-resection malabsorption K91.2    Mild intermittent asthma without complication T67.01       PSH:  Past Surgical History:   Procedure Laterality Date    HX APPENDECTOMY      HX  SECTION      HX CHOLECYSTECTOMY      HX GASTRIC BYPASS  2018    HX HYSTERECTOMY          SH:  Social History     Tobacco Use    Smoking status: Never Smoker    Smokeless tobacco: Never Used   Substance Use Topics    Alcohol use: No    Drug use: No       FH:  Family History   Problem Relation Age of Onset    Diabetes Mother     Arthritis-osteo Mother     Hypertension Mother     Diabetes Father     COPD Father        Medications/Allergies:    Current Outpatient Medications:     cholecalciferol, vitamin D3, (VITAMIN D3 PO), Take  by mouth. Bariatric Vitamin D daily, Disp: , Rfl:     pediatric multivitamins chewable tablet, Take 1 Tab by mouth daily. , Disp: , Rfl:     Calcium-Cholecalciferol, D3, 600 mg(1,500mg) -400 unit chew, Take 1 Tab by mouth daily. , Disp: , Rfl:     cyanocobalamin (VITAMIN B-12) 1,000 mcg sublingual tablet, Take 1,000 mcg by mouth daily. , Disp: , Rfl:     estradiol (ESTRACE) 2 mg tablet, TAKE 1 TABLET BY MOUTH EVERY DAY, Disp: , Rfl: 5    albuterol (PROVENTIL HFA, VENTOLIN HFA, PROAIR HFA) 90 mcg/actuation inhaler, Take  inhaled by mouth., Disp: , Rfl:     Allergies   Allergen Reactions    Promethazine Unknown (comments)     Other reaction(s): psychological reaction  Hallucinations       Objective:  Visit Vitals  /61   Pulse 67   Temp 96.7 °F (35.9 °C) (Oral)   Resp 16   Ht 5' 3\" (1.6 m)   Wt 118 lb 3.2 oz (53.6 kg)   SpO2 95%   BMI 20.94 kg/m²    Body mass index is 20.94 kg/m². Constitutional: Well developed, nourished, no distress, alert, normal habitus   Eyes: Conjunctiva normal.    Cardiovascular: S1, S2.  RRR. No murmurs/rubs. No thrills palpated. No carotid bruits. Intact distal pulses. No edema. Pulmonary/Chest Wall: No abnormalities on inspection. Clear to auscultation bilaterally. No wheezing/rhonchi. Normal effort. GI: Soft, nontender, nondistended. Normal active bowel sounds. No  masses on palpation. No hepatosplenomegaly.            Health Maintenance:   Health Maintenance   Topic Date Due    Pneumococcal 0-64 years (1 of 1 - PPSV23) 01/24/1983    DTaP/Tdap/Td series (1 - Tdap) 01/24/1988    Influenza Age 5 to Adult  08/01/2019    PAP AKA CERVICAL CYTOLOGY  10/01/2021    Lipid Screen  04/19/2023       No orders of the defined types were placed in this encounter.

## 2020-02-25 LAB
HBV SURFACE AB SER QL IA: POSITIVE
HBV SURFACE AB SERPL IA-ACNC: 17 MIU/ML
HBV SURFACE AG SER QL: <0.1 INDEX
HBV SURFACE AG SER QL: NEGATIVE
HEP BS AB COMMENT,HBSAC: NORMAL

## 2020-02-26 ENCOUNTER — DOCUMENTATION ONLY (OUTPATIENT)
Dept: FAMILY MEDICINE CLINIC | Age: 45
End: 2020-02-26

## 2020-02-26 LAB
A1AT SERPL-MCNC: 82 MG/DL (ref 101–187)
CENTROMERE B AB SER-ACNC: <0.2 AI (ref 0–0.9)
CERULOPLASMIN SERPL-MCNC: 23.1 MG/DL (ref 19–39)
CHROMATIN AB SERPL-ACNC: <0.2 AI (ref 0–0.9)
DSDNA AB SER-ACNC: 1 IU/ML (ref 0–9)
ENA JO1 AB SER-ACNC: <0.2 AI (ref 0–0.9)
ENA RNP AB SER-ACNC: <0.2 AI (ref 0–0.9)
ENA SCL70 AB SER-ACNC: <0.2 AI (ref 0–0.9)
ENA SM AB SER-ACNC: <0.2 AI (ref 0–0.9)
ENA SS-A AB SER-ACNC: <0.2 AI (ref 0–0.9)
ENA SS-B AB SER-ACNC: <0.2 AI (ref 0–0.9)
MITOCHONDRIA M2 IGG SER-ACNC: <20 UNITS (ref 0–20)
SEE BELOW, 164869: NORMAL

## 2020-02-26 NOTE — PROGRESS NOTES
I spoke to patient with 2 patient identifier and aware that hr labs showed he alpha 1 antitrypsin was mildly low. Dr. Fernando Sanchez wants to see her in 1-2 months to discuss this. Patient is aware not emergent can wait til 1-2 months. Patient agreed and understood it. Tranfer the call to [facility] to schedule her an appointment.

## 2020-02-26 NOTE — PROGRESS NOTES
Please call her- mildly low alpha 1 antitrypsin- I want to discuss with her- not emergent- any time in next month or two.

## 2020-03-30 ENCOUNTER — VIRTUAL VISIT (OUTPATIENT)
Dept: FAMILY MEDICINE CLINIC | Age: 45
End: 2020-03-30

## 2020-03-30 DIAGNOSIS — R74.01 TRANSAMINITIS: ICD-10-CM

## 2020-03-30 DIAGNOSIS — J45.20 MILD INTERMITTENT ASTHMA WITHOUT COMPLICATION: ICD-10-CM

## 2020-03-30 DIAGNOSIS — E88.01 LOW PLASMA ALPHA-1 ANTITRYPSIN (HCC): Primary | ICD-10-CM

## 2020-03-30 DIAGNOSIS — Z83.49 FAMILY HISTORY OF ALPHA 1 ANTITRYPSIN DEFICIENCY: ICD-10-CM

## 2020-03-30 NOTE — PROGRESS NOTES
Arnold Castro is a 39 y.o.  female and presents with Follow Up Chronic Condition; Other (s/p GBS andTransiminitis); and Results (labs)     Arnold Castro is a 39 y.o. female who was seen by synchronous (real-time) audio-video technology on 3/30/2020. Consent:  She and/or her healthcare decision maker is aware that this patient-initiated Telehealth encounter is a billable service, with coverage as determined by her insurance carrier. She is aware that she may receive a bill and has provided verbal consent to proceed: Yes    I was in the office while conducting this encounter. Assessment & Plan:   Diagnoses and all orders for this visit:    1. Low plasma alpha-1 antitrypsin (HCC)    2. Transaminitis    3. Mild intermittent asthma without complication              Coding Help - Use CPT Codes 19142-66089, 00501-54603 for Established and New Patients respectively, either employing EM elements or Time rules. Other codes (example consult codes) may also apply. 712  Subjective:   Arnold Castro was seen for Follow Up Chronic Condition; Other (s/p GBS andTransiminitis); and Results (labs)      Prior to Admission medications    Medication Sig Start Date End Date Taking? Authorizing Provider   cholecalciferol, vitamin D3, (VITAMIN D3 PO) Take  by mouth. Bariatric Vitamin D daily   Yes Provider, Historical   pediatric multivitamins chewable tablet Take 1 Tab by mouth daily. Yes Provider, Historical   Calcium-Cholecalciferol, D3, 600 mg(1,500mg) -400 unit chew Take 1 Tab by mouth daily. Yes Provider, Historical   cyanocobalamin (VITAMIN B-12) 1,000 mcg sublingual tablet Take 1,000 mcg by mouth daily. Yes Provider, Historical   albuterol (PROVENTIL HFA, VENTOLIN HFA, PROAIR HFA) 90 mcg/actuation inhaler Take  inhaled by mouth.    Yes Provider, Historical   estradiol (ESTRACE) 2 mg tablet TAKE 1 TABLET BY MOUTH EVERY DAY 1/11/18  Yes Provider, Historical     Allergies   Allergen Reactions    Promethazine Unknown (comments)     Other reaction(s): psychological reaction  Hallucinations       Patient Active Problem List   Diagnosis Code    Hypovitaminosis D E55.9    GIL on CPAP G47.33, Z99.89    Post-resection malabsorption K91.2    Mild intermittent asthma without complication D52.13     Patient Active Problem List    Diagnosis Date Noted    Mild intermittent asthma without complication 5748    Post-resection malabsorption 2018    Hypovitaminosis D 2018    GIL on CPAP 2018     Current Outpatient Medications   Medication Sig Dispense Refill    cholecalciferol, vitamin D3, (VITAMIN D3 PO) Take  by mouth. Bariatric Vitamin D daily      pediatric multivitamins chewable tablet Take 1 Tab by mouth daily.  Calcium-Cholecalciferol, D3, 600 mg(1,500mg) -400 unit chew Take 1 Tab by mouth daily.  cyanocobalamin (VITAMIN B-12) 1,000 mcg sublingual tablet Take 1,000 mcg by mouth daily.  albuterol (PROVENTIL HFA, VENTOLIN HFA, PROAIR HFA) 90 mcg/actuation inhaler Take  inhaled by mouth.       estradiol (ESTRACE) 2 mg tablet TAKE 1 TABLET BY MOUTH EVERY DAY  5     Allergies   Allergen Reactions    Promethazine Unknown (comments)     Other reaction(s): psychological reaction  Hallucinations     Past Medical History:   Diagnosis Date    Asthma     exercised induced    Autoimmune disease ([address]     Rheumatoid arthrtis    Morbid obesity ([address]     Morbid obesity ([address] 2018    Nausea & vomiting     Rheumatoid arthritis ([address] 2016    Sleep apnea     Uses CPAP     Past Surgical History:   Procedure Laterality Date    HX APPENDECTOMY      HX  SECTION      HX CHOLECYSTECTOMY      HX GASTRIC BYPASS  2018    HX HYSTERECTOMY       Family History   Problem Relation Age of Onset    Diabetes Mother     Arthritis-osteo Mother     Hypertension Mother     Diabetes Father     COPD Father      Social History     Tobacco Use    Smoking status: Never Smoker    Smokeless tobacco: Never Used   Substance Use Topics    Alcohol use: No       ROS    PHYSICAL EXAMINATION:  [ INSTRUCTIONS:  \"[x]\" Indicates a positive item  \"[]\" Indicates a negative item  -- DELETE ALL ITEMS NOT EXAMINED]  Vital Signs: (As obtained by patient/caregiver at home)  There were no vitals taken for this visit. Constitutional: [x] Appears well-developed and well-nourished [x] No apparent distress      [] Abnormal -     Mental status: [x] Alert and awake  [x] Oriented to person/place/time [x] Able to follow commands    [] Abnormal -     Eyes:   EOM    [x]  Normal    [] Abnormal -   Sclera  [x]  Normal    [] Abnormal -          Discharge [x]  None visible   [] Abnormal -     HENT: [x] Normocephalic, atraumatic  [] Abnormal -   [] Mouth/Throat: Mucous membranes are moist    External Ears [x] Normal  [] Abnormal -    Neck: [x] No visualized mass [] Abnormal -     Pulmonary/Chest: [x] Respiratory effort normal   [x] No visualized signs of difficulty breathing or respiratory distress        [] Abnormal -      Musculoskeletal:   [] Normal gait with no signs of ataxia         [x] Normal range of motion of neck        [] Abnormal -     Neurological:        [x] No Facial Asymmetry (Cranial nerve 7 motor function) (limited exam due to video visit)          [x] No gaze palsy        [] Abnormal -          Skin:        [x] No significant exanthematous lesions or discoloration noted on facial skin         [] Abnormal -            Psychiatric:       [x] Normal Affect [] Abnormal -        [x] No Hallucinations    Other pertinent observable physical exam findings:-        We discussed the expected course, resolution and complications of the diagnosis(es) in detail. Medication risks, benefits, costs, interactions, and alternatives were discussed as indicated. I advised her to contact the office if her condition worsens, changes or fails to improve as anticipated.  She expressed understanding with the diagnosis(es) and plan. Pursuant to the emergency declaration under the [facility], [facility] waiver authority and the [employer] and [employer] Act, this Virtual  Visit was conducted, with patient's consent, to reduce the patient's risk of exposure to COVID-19 and provide continuity of care for an established patient. Services were provided through a video synchronous discussion virtually to substitute for in-person clinic visit. Esme Thapa MD      Subjective:    S/P GBS- doing very well with weight loss. Following diet. No abdominal pain or fevers or chills. Last thyroid check was over one year ago. No diarrhea. No constipation. Asthmastable rare rescue inhaler use. Had seen pulmonary in the past.    Transaminitis-mildreviewed with patient. No abdominal pain. No jaundice. Question of previous rheumatoid arthritis however laboratory testing was negative. Patient also reports that her joint pain improved significantly with weight loss. Low alpha one antitrypsin reviewed with her today and she states her father with emphysema was told he has a genetic cause although he was a smoker as well. She does get monthly injections of Xolair for eczema from the dermatologist.      Assessment/plan  Diagnoses and all orders for this visit:    1. Transaminitis-possibly due to alpha-1 antitrypsin deficiencyespecially in view of the father possibly being diagnosed with same. Will pursue alpha 1 antitrypsin gene testing and pulmonary function tests prior to follow-up in July given ongoing pandemic with Covid 19. MRI did not show steatosis. Negative hep C also. 2. Mild intermittent asthma without complication-stable. No changes        4. Health maintenancewe will give Tdap and Pneumovax 23 after discussion today    Diagnoses and all orders for this visit:    1.  Low plasma alpha-1 antitrypsin (HCC)  -     PULMONARY FUNCTION TEST; Future    2. Transaminitis    3. Mild intermittent asthma without complication  -     PULMONARY FUNCTION TEST; Future    4. Family history of alpha 1 antitrypsin deficiency  -     PULMONARY FUNCTION TEST; Future    follow up august- labs/pft's in july if possible. ROS:  Constitutional: No recent weight change. No weakness/fatigue. No f/c. Skin: No rashes, change in nails/hair, itching   HENT: No HA, dizziness. No hearing loss/tinnitus. No nasal congestion/discharge. Eyes: No change in vision, double/blurred vision or eye pain/redness. Cardiovascular: No CP/palpitations. No ANDERSON/orthopnea/PND. Respiratory: No cough/sputum, dyspnea, wheezing. Gastointestinal: No dysphagia, reflux. No n/v. No constipation/diarrhea. No melena/rectal bleeding. Genitourinary: No dysuria, urinary hesitancy, nocturia, hematuria. No incontinence. Musculoskeletal: No joint pain/stiffness. No muscle pain/tenderness. Endo: No heat/cold intolerance, no polyuria/polydypsia. Heme: No h/o anemia. No easy bleeding/bruising. Allergy/Immunology: No seasonal rhinitis. Denies frequent colds, sinus/ear infections. Neurological: No seizures/numbness/weakness. No paresthesias. Psychiatric:  No depression, anxiety.      PMH:  Past Medical History:   Diagnosis Date    Asthma     exercised induced    Autoimmune disease ([address]     Rheumatoid arthrtis    Morbid obesity ([address]     Morbid obesity ([facility] 2018    Nausea & vomiting     Rheumatoid arthritis ([facility] 2016    Sleep apnea     Uses CPAP       Patient Active Problem List   Diagnosis Code    Hypovitaminosis D E55.9    GIL on CPAP G47.33, Z99.89    Post-resection malabsorption K91.2    Mild intermittent asthma without complication I43.97       PSH:  Past Surgical History:   Procedure Laterality Date    HX APPENDECTOMY      HX  SECTION      HX CHOLECYSTECTOMY      HX GASTRIC BYPASS  2018    HX HYSTERECTOMY          SH:  Social History     Tobacco Use    Smoking status: Never Smoker    Smokeless tobacco: Never Used   Substance Use Topics    Alcohol use: No    Drug use: No       FH:  Family History   Problem Relation Age of Onset    Diabetes Mother     Arthritis-osteo Mother     Hypertension Mother     Diabetes Father     COPD Father        Medications/Allergies:    Current Outpatient Medications:     cholecalciferol, vitamin D3, (VITAMIN D3 PO), Take  by mouth. Bariatric Vitamin D daily, Disp: , Rfl:     pediatric multivitamins chewable tablet, Take 1 Tab by mouth daily. , Disp: , Rfl:     Calcium-Cholecalciferol, D3, 600 mg(1,500mg) -400 unit chew, Take 1 Tab by mouth daily. , Disp: , Rfl:     cyanocobalamin (VITAMIN B-12) 1,000 mcg sublingual tablet, Take 1,000 mcg by mouth daily. , Disp: , Rfl:     albuterol (PROVENTIL HFA, VENTOLIN HFA, PROAIR HFA) 90 mcg/actuation inhaler, Take  inhaled by mouth., Disp: , Rfl:     estradiol (ESTRACE) 2 mg tablet, TAKE 1 TABLET BY MOUTH EVERY DAY, Disp: , Rfl: 5    Allergies   Allergen Reactions    Promethazine Unknown (comments)     Other reaction(s): psychological reaction  Hallucinations       Objective: There were no vitals taken for this visit. There is no height or weight on file to calculate BMI. Constitutional: Well developed, nourished, no distress, alert, normal habitus   Eyes: Conjunctiva normal.    Cardiovascular: S1, S2.  RRR. No murmurs/rubs. No thrills palpated. No carotid bruits. Intact distal pulses. No edema. Pulmonary/Chest Wall: No abnormalities on inspection. Clear to auscultation bilaterally. No wheezing/rhonchi. Normal effort. GI: Soft, nontender, nondistended. Normal active bowel sounds. No  masses on palpation. No hepatosplenomegaly.            Health Maintenance:   Health Maintenance   Topic Date Due    PAP AKA CERVICAL CYTOLOGY  10/01/2021    Lipid Screen  04/19/2023    DTaP/Tdap/Td series (2 - Td) 02/24/2030    Influenza Age 9 to Adult Completed    Pneumococcal 0-64 years  Completed       No orders of the defined types were placed in this encounter.

## 2020-05-18 DIAGNOSIS — R68.89 FLU-LIKE SYMPTOMS: Primary | ICD-10-CM

## 2020-05-18 DIAGNOSIS — R68.89 FLU-LIKE SYMPTOMS: ICD-10-CM

## 2020-05-27 LAB — SARS-COV-2, COV2NT: NOT DETECTED

## 2020-05-27 NOTE — PROGRESS NOTES
I called and spoke to patient with 2 patient identifier and aware that her Covid-19 test was negative.

## 2020-06-04 ENCOUNTER — DOCUMENTATION ONLY (OUTPATIENT)
Dept: SURGERY | Age: 45
End: 2020-06-04

## 2020-06-04 NOTE — LETTER
[facility] Surgical Specialist 
GINA/Rhett Garcia Brian [place]. [facility], [address] [place] [address] [facility] [place] [address] [place] [address] Surgical Specialists [place]one Dear Patient, Your health is our main concern. It is important for your health to have follow-up lab work and to see your surgeon at 3 months, 6 months and annually after your weight loss surgery. Additionally, the Department of Bariatric Surgery at our hospital is a member of the [employer] [address] Surgical Quality Improvement Program ([facility] NSQIP). As a participant in this program, we gather information on the outcomes of our patients after surgery. Please call the office for a follow up appointment at [phone]. If you have moved out of the area or have changed surgeons please call us and let us know the name of your doctor. Your health and feedback are important to us. We greatly appreciate your response. Thank you, [facility] [address]

## 2020-06-04 NOTE — PROGRESS NOTES
Per [facility] requirements;  E-mail and letter sent for follow up appointment. [facility] Surgical Specialist  C/Rhett Stubbs. [address] [facility] [place]   [employer] [facility]  [facility]        Dear Patient,        Your health is our main concern. It is important for your health to have follow-up lab work and to see your surgeon at 3 months, 6 months and annually after your weight loss surgery. Additionally, the Department of Bariatric Surgery at our hospital is a member of the [employer] [address] Surgical Quality Improvement Program ([facility] NSQIP). As a participant in this program, we gather information on the outcomes of our patients after surgery. Please call the office for a follow up appointment at [phone]. If you have moved out of the area or have changed surgeons please call us and let us know the name of your doctor. Your health and feedback are important to us. We greatly appreciate your response.        Thank you,  [facility]

## 2020-07-08 ENCOUNTER — TELEPHONE (OUTPATIENT)
Dept: SURGERY | Age: 45
End: 2020-07-08

## 2020-07-08 DIAGNOSIS — E55.9 HYPOVITAMINOSIS D: ICD-10-CM

## 2020-07-08 DIAGNOSIS — Z98.84 HISTORY OF ROUX-EN-Y GASTRIC BYPASS: Primary | ICD-10-CM

## 2020-07-09 ENCOUNTER — HOSPITAL ENCOUNTER (OUTPATIENT)
Dept: LAB | Age: 45
Discharge: HOME | End: 2020-07-09
Payer: COMMERCIAL

## 2020-07-09 DIAGNOSIS — Z98.84 HISTORY OF ROUX-EN-Y GASTRIC BYPASS: ICD-10-CM

## 2020-07-09 DIAGNOSIS — E55.9 HYPOVITAMINOSIS D: ICD-10-CM

## 2020-07-09 LAB
25(OH)D3 SERPL-MCNC: 24 NG/ML (ref 30–100)
ALBUMIN SERPL-MCNC: 4 G/DL (ref 3.4–5)
ANION GAP SERPL CALC-SCNC: 5 MMOL/L (ref 3–18)
BASOPHILS # BLD: 0 K/UL (ref 0–0.1)
BASOPHILS NFR BLD: 0 % (ref 0–2)
BUN SERPL-MCNC: 20 MG/DL (ref 7–18)
BUN/CREAT SERPL: 34 (ref 12–20)
CALCIUM SERPL-MCNC: 8.8 MG/DL (ref 8.5–10.1)
CHLORIDE SERPL-SCNC: 105 MMOL/L (ref 100–111)
CO2 SERPL-SCNC: 29 MMOL/L (ref 21–32)
CREAT SERPL-MCNC: 0.58 MG/DL (ref 0.6–1.3)
DIFFERENTIAL METHOD BLD: NORMAL
EOSINOPHIL # BLD: 0.2 K/UL (ref 0–0.4)
EOSINOPHIL NFR BLD: 3 % (ref 0–5)
ERYTHROCYTE [DISTWIDTH] IN BLOOD BY AUTOMATED COUNT: 12.4 % (ref 11.6–14.5)
FERRITIN SERPL-MCNC: 37 NG/ML (ref 8–388)
FOLATE SERPL-MCNC: 11.5 NG/ML (ref 3.1–17.5)
GLUCOSE SERPL-MCNC: 90 MG/DL (ref 74–99)
HCT VFR BLD AUTO: 41.2 % (ref 35–45)
HGB BLD-MCNC: 13.7 G/DL (ref 12–16)
IRON SERPL-MCNC: 79 UG/DL (ref 50–175)
LYMPHOCYTES # BLD: 3.2 K/UL (ref 0.9–3.6)
LYMPHOCYTES NFR BLD: 49 % (ref 21–52)
MCH RBC QN AUTO: 30.3 PG (ref 24–34)
MCHC RBC AUTO-ENTMCNC: 33.3 G/DL (ref 31–37)
MCV RBC AUTO: 91.2 FL (ref 74–97)
MONOCYTES # BLD: 0.4 K/UL (ref 0.05–1.2)
MONOCYTES NFR BLD: 6 % (ref 3–10)
NEUTS SEG # BLD: 2.8 K/UL (ref 1.8–8)
NEUTS SEG NFR BLD: 42 % (ref 40–73)
PLATELET # BLD AUTO: 231 K/UL (ref 135–420)
PMV BLD AUTO: 10 FL (ref 9.2–11.8)
POTASSIUM SERPL-SCNC: 3.7 MMOL/L (ref 3.5–5.5)
RBC # BLD AUTO: 4.52 M/UL (ref 4.2–5.3)
SODIUM SERPL-SCNC: 139 MMOL/L (ref 136–145)
VIT B12 SERPL-MCNC: 350 PG/ML (ref 211–911)
WBC # BLD AUTO: 6.6 K/UL (ref 4.6–13.2)

## 2020-07-09 PROCEDURE — 83540 ASSAY OF IRON: CPT

## 2020-07-09 PROCEDURE — 82607 VITAMIN B-12: CPT

## 2020-07-09 PROCEDURE — 80048 BASIC METABOLIC PNL TOTAL CA: CPT

## 2020-07-09 PROCEDURE — 84425 ASSAY OF VITAMIN B-1: CPT

## 2020-07-09 PROCEDURE — 82306 VITAMIN D 25 HYDROXY: CPT

## 2020-07-09 PROCEDURE — 82040 ASSAY OF SERUM ALBUMIN: CPT

## 2020-07-09 PROCEDURE — 36415 COLL VENOUS BLD VENIPUNCTURE: CPT

## 2020-07-09 PROCEDURE — 85025 COMPLETE CBC W/AUTO DIFF WBC: CPT

## 2020-07-09 PROCEDURE — 82728 ASSAY OF FERRITIN: CPT

## 2020-07-13 LAB — VIT B1 BLD-SCNC: 82.6 NMOL/L (ref 66.5–200)

## 2020-08-17 DIAGNOSIS — Z83.49 FAMILY HISTORY OF ALPHA 1 ANTITRYPSIN DEFICIENCY: ICD-10-CM

## 2020-08-17 DIAGNOSIS — R74.01 TRANSAMINITIS: ICD-10-CM

## 2020-08-17 DIAGNOSIS — E55.9 HYPOVITAMINOSIS D: ICD-10-CM

## 2020-08-17 DIAGNOSIS — E88.01 LOW PLASMA ALPHA-1 ANTITRYPSIN (HCC): Primary | ICD-10-CM

## 2020-08-17 DIAGNOSIS — J45.20 MILD INTERMITTENT ASTHMA WITHOUT COMPLICATION: ICD-10-CM

## 2020-08-24 ENCOUNTER — VIRTUAL VISIT (OUTPATIENT)
Dept: FAMILY MEDICINE CLINIC | Age: 45
End: 2020-08-24

## 2020-08-24 DIAGNOSIS — E88.01 LOW PLASMA ALPHA-1 ANTITRYPSIN (HCC): Primary | ICD-10-CM

## 2020-08-24 DIAGNOSIS — J45.20 MILD INTERMITTENT ASTHMA WITHOUT COMPLICATION: ICD-10-CM

## 2020-08-24 RX ORDER — ALBUTEROL SULFATE 90 UG/1
AEROSOL, METERED RESPIRATORY (INHALATION)
Qty: 1 INHALER | Refills: 2 | Status: SHIPPED | OUTPATIENT
Start: 2020-08-24 | End: 2020-08-25 | Stop reason: SDUPTHER

## 2020-08-24 NOTE — PROGRESS NOTES
Rashmi Moss is a 39 y.o. female who was seen by synchronous (real-time) audio-video technology on 8/24/2020 for Follow Up Chronic Condition; Asthma; Other (Low plasma alpha-1 antitrypsin & Transaminitis); and Results (PFT results, labs is still pending.)        Assessment & Plan:   Diagnoses and all orders for this visit:    1. Low plasma alpha-1 antitrypsin (HCC)    2. Mild intermittent asthma without complication     transaminitis- seeing Dr. Vini Galindo (GI)- mild transaminitis- keep f/u with GI- await Gene testing as some variants may also affect hepatic function. Asthma- stable- reviewed PFT's and will need to see pulmonary if gene testing is positive. Subjective:    Asthma- stable- no sob or wheezes. Low plasma alpha-1-antitrypsin- reviewed we are waiting for gene testing. RTC in 6 months  Orders Placed This Encounter    albuterol (PROVENTIL HFA, VENTOLIN HFA, PROAIR HFA) 90 mcg/actuation inhaler     Sig: Take  inhaled by mouth. Dispense:  1 Inhaler     Refill:  2         Complete Pulmonary Function Test (Bronchodilator if indicated)Resulted: 5/28/2020 2:30 PM  [address]  Component Name Value Ref Range   FVC-Pre 3.61 L   FVC-%Pred-Pre 107 %   FEV1-Pre 2.96 L   FEV1-%Pred-Pre 107 %   FEV1-Post 3.08 L   FEV1-%Change-Post 4 %   FEV1FVC-Pre 82 %   FEFMax-Pre 6.94 L/sec   FEFMax-Post 7.55 L/sec   FEFMax-%Change-Post 8 %   BUF3176-%Pred Pre 93 %   TLC(N2)-%Pred-Pre 121 %   DLCOCOR-Pre 25.16 ml/min/mmHg   DLCOCOR-Pred-Pre 116 %   DLVA-%Pred-Pre 119 %   Other Result Information   This result has an attachment that is not available. Result Narrative   Spirometry with bronchodilator challenge, Lung volume measurements by nitrogen wash out, and Diffusion capacity, were performed. Patient effort was satisfactory for interpretation. The shape of the flow volume loop is within normal limits. The FVC, FEV1, FEV1/FVC ratio and PWW04-46% are within normal limits.  The TLC, RV, FRC and RV/TLC ratio are all increased suggesting overinflation and air trapping.  Following administration of bronchodilators, there is no significant response.  The diffusing capacity is normal.  Conclusions: Overinflation without concurrent obstruction is of uncertain significance. Normal spirometry and gas exchange- clinical correlatrion recommended. No prior PFTs on file for comparison. ««This interpretation has been electronically signed: Jackcelina  05/28/2020  02:29:51 PM»»           712  Subjective:       Prior to Admission medications    Medication Sig Start Date End Date Taking? Authorizing Provider   cholecalciferol, vitamin D3, (VITAMIN D3 PO) Take  by mouth. Bariatric Vitamin D daily   Yes Provider, Historical   pediatric multivitamins chewable tablet Take 1 Tab by mouth daily. Yes Provider, Historical   Calcium-Cholecalciferol, D3, 600 mg(1,500mg) -400 unit chew Take 1 Tab by mouth daily. Yes Provider, Historical   cyanocobalamin (VITAMIN B-12) 1,000 mcg sublingual tablet Take 1,000 mcg by mouth daily. Yes Provider, Historical   estradiol (ESTRACE) 2 mg tablet TAKE 1 TABLET BY MOUTH EVERY DAY 1/11/18  Yes Provider, Historical   albuterol (PROVENTIL HFA, VENTOLIN HFA, PROAIR HFA) 90 mcg/actuation inhaler Take 2 Puffs by inhalation every six (6) hours as needed for Wheezing.  Take  inhaled by mouth. 8/27/20   Moises Dalton MD     Patient Active Problem List   Diagnosis Code    Hypovitaminosis D E55.9    GIL on CPAP G47.33, Z99.89    Post-resection malabsorption K91.2    Mild intermittent asthma without complication W63.13    Heterozygous alpha 1-antitrypsin deficiency ([facility] E88.01     Patient Active Problem List    Diagnosis Date Noted    Heterozygous alpha 1-antitrypsin deficiency ([facility] 08/26/2020    Mild intermittent asthma without complication 50/50/0320    Post-resection malabsorption 07/18/2018    Hypovitaminosis D 04/25/2018    GIL on CPAP 04/25/2018     Current Outpatient Medications Medication Sig Dispense Refill    cholecalciferol, vitamin D3, (VITAMIN D3 PO) Take  by mouth. Bariatric Vitamin D daily      pediatric multivitamins chewable tablet Take 1 Tab by mouth daily.  Calcium-Cholecalciferol, D3, 600 mg(1,500mg) -400 unit chew Take 1 Tab by mouth daily.  cyanocobalamin (VITAMIN B-12) 1,000 mcg sublingual tablet Take 1,000 mcg by mouth daily.  estradiol (ESTRACE) 2 mg tablet TAKE 1 TABLET BY MOUTH EVERY DAY  5    albuterol (PROVENTIL HFA, VENTOLIN HFA, PROAIR HFA) 90 mcg/actuation inhaler Take 2 Puffs by inhalation every six (6) hours as needed for Wheezing. Take  inhaled by mouth. 1 Inhaler 5     Allergies   Allergen Reactions    Promethazine Unknown (comments)     Other reaction(s): psychological reaction  Hallucinations     Past Medical History:   Diagnosis Date    Asthma     exercised induced    Autoimmune disease ([address]     Rheumatoid arthrtis    Morbid obesity ([address]     Morbid obesity ([address] 2018    Nausea & vomiting     Rheumatoid arthritis ([address] 2016    Sleep apnea     Uses CPAP     Past Surgical History:   Procedure Laterality Date    HX APPENDECTOMY      HX  SECTION      HX CHOLECYSTECTOMY      HX GASTRIC BYPASS  2018    HX HYSTERECTOMY  2014     Family History   Problem Relation Age of Onset    Diabetes Mother     Arthritis-osteo Mother     Hypertension Mother     Diabetes Father     COPD Father      Social History     Tobacco Use    Smoking status: Never Smoker    Smokeless tobacco: Never Used   Substance Use Topics    Alcohol use: No       ROS  Cardiac- no chest pain or palpitations  Pulmonary- no sob or wheezes  GI- no n/v or diarrhea. Objective:   No flowsheet data found.    General: alert, cooperative, no distress   Mental  status: normal mood, behavior, speech, dress, motor activity, and thought processes, able to follow commands   HENT: NCAT   Neck: no visualized mass   Resp: no respiratory distress   Neuro: no gross deficits   Skin: no discoloration or lesions of concern on visible areas   Psychiatric: normal affect, consistent with stated mood, no evidence of hallucinations     Additional exam findings: We discussed the expected course, resolution and complications of the diagnosis(es) in detail. Medication risks, benefits, costs, interactions, and alternatives were discussed as indicated. I advised her to contact the office if her condition worsens, changes or fails to improve as anticipated. She expressed understanding with the diagnosis(es) and plan. Ronan Ruelas, who was evaluated through a patient-initiated, synchronous (real-time) audio-video encounter, and/or her healthcare decision maker, is aware that it is a billable service, with coverage as determined by her insurance carrier. She provided verbal consent to proceed: Yes, and patient identification was verified. It was conducted pursuant to the emergency declaration under the [facility], [address] [place] [address] authority and the [employer] Act. A caregiver was present when appropriate. Ability to conduct physical exam was limited. I was in the office. The patient was at home.       Briana Mccall MD

## 2020-08-24 NOTE — PROGRESS NOTES
1. Have you been to the ER, urgent care clinic since your last visit? Hospitalized since your last visit? No.    2. Have you seen or consulted any other health care providers outside of the [address] [place] [address] since your last visit? Include any pap smears or colon screening.  No.      Chief Complaint   Patient presents with    Follow Up Chronic Condition    Asthma    Other     Low plasma alpha-1 antitrypsin & Transaminitis

## 2020-08-25 LAB
Lab: NORMAL
Lab: NORMAL
REFERRING PHYSICIAN: NORMAL

## 2020-08-25 NOTE — TELEPHONE ENCOUNTER
[facility] called and needed an instruction on her Albuterol inhaler you sent yesterday. Please resend with Instructions.  Thanks

## 2020-08-26 ENCOUNTER — DOCUMENTATION ONLY (OUTPATIENT)
Dept: FAMILY MEDICINE CLINIC | Age: 45
End: 2020-08-26

## 2020-08-26 DIAGNOSIS — E88.01 LOW PLASMA ALPHA-1 ANTITRYPSIN (HCC): Primary | ICD-10-CM

## 2020-08-26 DIAGNOSIS — J45.20 MILD INTERMITTENT ASTHMA WITHOUT COMPLICATION: ICD-10-CM

## 2020-08-26 NOTE — PROGRESS NOTES
Pt called- reviewed results of alpha 1 antitrypsin gene mutation and discussed her MZ result- will refer to pulmonary and reviewed continuing nonsmoker status. Continue to follow but PFT's done recently. Also given MZ status this is less likely etiology of transaminitis.

## 2020-08-27 RX ORDER — ALBUTEROL SULFATE 90 UG/1
2 AEROSOL, METERED RESPIRATORY (INHALATION)
Qty: 1 INHALER | Refills: 5 | Status: SHIPPED | OUTPATIENT
Start: 2020-08-27

## 2020-09-10 ENCOUNTER — VIRTUAL VISIT (OUTPATIENT)
Dept: SURGERY | Age: 45
End: 2020-09-10

## 2020-09-10 VITALS
HEIGHT: 63 IN | DIASTOLIC BLOOD PRESSURE: 69 MMHG | BODY MASS INDEX: 21.26 KG/M2 | WEIGHT: 120 LBS | SYSTOLIC BLOOD PRESSURE: 97 MMHG

## 2020-09-10 DIAGNOSIS — K91.2 POST-RESECTION MALABSORPTION: ICD-10-CM

## 2020-09-10 DIAGNOSIS — E55.9 HYPOVITAMINOSIS D: ICD-10-CM

## 2020-09-10 DIAGNOSIS — Z98.84 S/P GASTRIC BYPASS: ICD-10-CM

## 2020-09-10 DIAGNOSIS — Z98.84 HISTORY OF ROUX-EN-Y GASTRIC BYPASS: Primary | ICD-10-CM

## 2020-09-10 NOTE — PROGRESS NOTES
Consent:  Brandon Hazel and/or her healthcare decision maker is aware that this patient-initiated Telehealth encounter is a billable service, with coverage as determined by her insurance carrier. She is aware that she may receive a bill and has provided verbal consent to proceed: Yes    Provider location while conducting visit: in the office  Patient location: Home  Other person participating in the telehealth services: Shravan Bernal   This virtual visit was conducted via interactive/real-time audio/video using [email or web address]   Visit start: [id number]  Visit end: [address] [facility]        Bariatric Postoperative Progress Note    Afshin West is a 39 y.o. female is now 2 years status post laparoscopic gastric bypass surgery who was seen by synchronous (real-time) audio-video technology on 9/10/2020. Doing well overall. Currently on a stage 4 diet without difficulty. Taking in 64 oz water,  60 g protein. 30-60 min of activity 7 days a week. Bowel movements are regular. The patient is not having any pain. . She is compliant with multivitamins, calcium, Vit D and B12 supplements.       Weight Loss Metrics 9/10/2020 9/10/2020 2/24/2020 7/2/2019 7/2/2019 6/3/2019 5/9/2019   Pre op / Initial Wt 216 - - 216 - - -   Today's Wt - 120 lb 118 lb 3.2 oz - 111 lb 112 lb 12.8 oz 116 lb   BMI - 21.26 kg/m2 20.94 kg/m2 - 19.66 kg/m2 19.98 kg/m2 21.22 kg/m2   Ideal Body Wt 125 - - 125 - - -   Excess Body Wt 91 - - 91 - - -   Goal Wt 143 - - 143 - - -   Wt loss to date 96 - - 105 - - -   % Wt Loss 1.32 - - 1.44 - - -   80% EBW 72.8 - - 72.8 - - -     Comorbidities:  Hypertension: not applicable  Diabetes: not applicable  Obstructive Sleep Apnea: resolved  Hyperlipidemia: not applicable  Stress Urinary Incontinence: not applicable  Gastroesophageal Reflux: not applicable  Weight related arthropathy:resolved      Past Medical History:   Diagnosis Date    Asthma     exercised induced    Autoimmune disease ([facility] [address]     Rheumatoid arthrtis    [facility] [address]     Morbid obesity ([facility] [address] 2018    Nausea & vomiting     Rheumatoid arthritis ([facility] [address] 2016    Sleep apnea     Uses CPAP       Past Surgical History:   Procedure Laterality Date    HX APPENDECTOMY      HX  SECTION      HX CHOLECYSTECTOMY      HX GASTRIC BYPASS  2018    HX HYSTERECTOMY  2014       Current Outpatient Medications   Medication Sig Dispense Refill    albuterol (PROVENTIL HFA, VENTOLIN HFA, PROAIR HFA) 90 mcg/actuation inhaler Take 2 Puffs by inhalation every six (6) hours as needed for Wheezing. Take  inhaled by mouth. 1 Inhaler 5    cholecalciferol, vitamin D3, (VITAMIN D3 PO) Take  by mouth. Bariatric Vitamin D daily      pediatric multivitamins chewable tablet Take 1 Tab by mouth daily.  Calcium-Cholecalciferol, D3, 600 mg(1,500mg) -400 unit chew Take 1 Tab by mouth daily.  cyanocobalamin (VITAMIN B-12) 1,000 mcg sublingual tablet Take 1,000 mcg by mouth daily.  estradiol (ESTRACE) 2 mg tablet TAKE 1 TABLET BY MOUTH EVERY DAY  5       Allergies   Allergen Reactions    Promethazine Unknown (comments)     Other reaction(s): psychological reaction  Hallucinations       ROS:  Review of Systems   All other systems reviewed and are negative. Physicial Exam:  Visit Vitals  BP 97/69   Ht 5' 3\" (1.6 m)   Wt 54.4 kg (120 lb)   BMI 21.26 kg/m²    (As obtained by patient/caregiver at home)  Physical Exam  Vitals signs reviewed. HENT:      Head: Normocephalic and atraumatic. Pulmonary:      Effort: Pulmonary effort is normal.   Neurological:      Mental Status: She is alert and oriented to person, place, and time.    Psychiatric:         Mood and Affect: Mood and affect normal.         Labs:   Recent Results (from the past 1680 hour(s))   ALBUMIN    Collection Time: 20  4:45 PM   Result Value Ref Range    Albumin 4.0 3.4 - 5.0 g/dL   CBC WITH AUTOMATED DIFF    Collection Time: 20  4:45 PM   Result Value Ref Range    WBC 6.6 4.6 - 13.2 K/uL RBC 4.52 4.20 - 5.30 M/uL    HGB 13.7 12.0 - 16.0 g/dL    HCT 41.2 35.0 - 45.0 %    MCV 91.2 74.0 - 97.0 FL    MCH 30.3 24.0 - 34.0 PG    MCHC 33.3 31.0 - 37.0 g/dL    RDW 12.4 11.6 - 14.5 %    PLATELET 851 439 - 635 K/uL    MPV 10.0 9.2 - 11.8 FL    NEUTROPHILS 42 40 - 73 %    LYMPHOCYTES 49 21 - 52 %    MONOCYTES 6 3 - 10 %    EOSINOPHILS 3 0 - 5 %    BASOPHILS 0 0 - 2 %    ABS. NEUTROPHILS 2.8 1.8 - 8.0 K/UL    ABS. LYMPHOCYTES 3.2 0.9 - 3.6 K/UL    ABS. MONOCYTES 0.4 0.05 - 1.2 K/UL    ABS. EOSINOPHILS 0.2 0.0 - 0.4 K/UL    ABS. BASOPHILS 0.0 0.0 - 0.1 K/UL    DF AUTOMATED     IRON    Collection Time: 07/09/20  4:45 PM   Result Value Ref Range    Iron 79 50 - 561 ug/dL   METABOLIC PANEL, BASIC    Collection Time: 07/09/20  4:45 PM   Result Value Ref Range    Sodium 139 136 - 145 mmol/L    Potassium 3.7 3.5 - 5.5 mmol/L    Chloride 105 100 - 111 mmol/L    CO2 29 21 - 32 mmol/L    Anion gap 5 3.0 - 18 mmol/L    Glucose 90 74 - 99 mg/dL    BUN 20 (H) 7.0 - 18 MG/DL    Creatinine 0.58 (L) 0.6 - 1.3 MG/DL    BUN/Creatinine ratio 34 (H) 12 - 20      GFR est AA >60 >60 ml/min/1.73m2    GFR est non-AA >60 >60 ml/min/1.73m2    Calcium 8.8 8.5 - 10.1 MG/DL   VITAMIN B1, WHOLE BLOOD    Collection Time: 07/09/20  4:45 PM   Result Value Ref Range    Vitamin B1 82.6 66.5 - 200.0 nmol/L   VITAMIN B12 & FOLATE    Collection Time: 07/09/20  4:45 PM   Result Value Ref Range    Vitamin B12 350 211 - 911 pg/mL    Folate 11.5 3.10 - 17.50 ng/mL   VITAMIN D, 25 HYDROXY    Collection Time: 07/09/20  4:45 PM   Result Value Ref Range    Vitamin D 25-Hydroxy 24.0 (L) 30 - 100 ng/mL   FERRITIN    Collection Time: 07/09/20  4:45 PM   Result Value Ref Range    Ferritin 37 8 - 388 NG/ML   ALPHA-1-ANTITRYPSIN DEFICIENCY    Collection Time: 08/17/20  9:08 AM   Result Value Ref Range    Alpha-1 Antitrypsin Mutation Analysis SEE NOTE     Clinical indication [id number]     Referring Physician YOEL KIM        Assessment/Plan:    Today, Ant Fox is  Height: 5' 3\" (160 cm) tall, Weight: 54.4 kg (120 lb) lbs for a Body mass index is 21.26 kg/m². They are currently 2 years s/p laparoscopic gastric bypass surgery with a total weight loss of 96  lbs to date, doing very well @ goal.  Requesting ref for new GI, no longer at previous practice. Labs were reviewed and pt was instructed to continue MVI/Ca/B12/D3/B1 supplementation does need to inc Vit D3 to 10,000 units every other day to bring levels up. We have reviewed the components of a successful postoperative course including requirement for a high protein, low carbohydrate diet, 64 oz a day of zero calorie liquids, daily vitamin supplementation, daily exercise (150 mis/week), regular follow-up, and participation in support groups. We discussed the expected course, resolution and complications of the diagnosis(es) in detail. Medication risks, benefits, costs, interactions, and alternatives were discussed as indicated. I advised her to contact the office if her condition worsens, changes or fails to improve as anticipated. She expressed understanding with the diagnosis(es) and plan. The primary encounter diagnosis was History of Dilcia-en-Y gastric bypass. Diagnoses of Hypovitaminosis D, Post-resection malabsorption, S/P gastric bypass, and BMI 21.0-21.9, adult were also pertinent to this visit. Follow up yearly with labs, sooner as needed. Services were provided through a video synchronous discussion virtually to substitute for in-person clinic visit. Pursuant to the emergency declaration under the [facility], [facility] waiver authority and the [employer] General Act, this Virtual  Visit was conducted, with patient's consent, to reduce the patient's risk of exposure to COVID-19 and provide continuity of care for an established patient.     Vidal Kyle NP

## 2020-09-10 NOTE — PATIENT INSTRUCTIONS
Please follow up with the dietitian. Daniela Jensen RD, [address] [place] [address] Bariatric Dietitian at [facility] [phone] / [email or web address] Allen Goodpasture, RD  Bariatric Dietitian at [facility] [phone] / [email or web address]

## 2020-09-10 NOTE — PROGRESS NOTES
Greg Rasmussen is a 39 y.o. female  Chief Complaint   Patient presents with    Follow-up     gbp 7/2/2018     Pt ID confirmed    Weight Loss Metrics 9/10/2020 2/24/2020 7/2/2019 7/2/2019 6/3/2019 5/9/2019 5/3/2019   Pre op / Initial Wt - - 216 - - - 216   Today's Wt 120 lb 118 lb 3.2 oz - 111 lb 112 lb 12.8 oz 116 lb -   BMI 21.26 kg/m2 20.94 kg/m2 - 19.66 kg/m2 19.98 kg/m2 21.22 kg/m2 -   Ideal Body Wt - - 125 - - - 125   Excess Body Wt - - 91 - - - 91   Goal Wt - - 143 - - - 144   Wt loss to date - - 105 - - - 98   % Wt Loss - - 1.44 - - - 1.35   80% EBW - - 72.8 - - - 72.8       Body mass index is 21.26 kg/m².       Post op medications:  MVI: flinsdineshes 2x daily   Calcium Citrate: 65282 mg daily  B-12 1000 mg daily  Vit D 3 5000 units

## 2020-12-28 ENCOUNTER — TELEPHONE (OUTPATIENT)
Dept: SURGERY | Age: 45
End: 2020-12-28

## 2020-12-28 NOTE — TELEPHONE ENCOUNTER
Patient states she has tested positive for covid-19 yesterday. Patient states received results yesterday. Patient states she went to ER on Saturday morning 12/26/20 due to was running a fever of 100.1-100.2 on Friday 12/25/20. Patient states when she went to the ER on sat fever was gone. Patient states currently having severe upper left abd pain, severe body aches, and sore throat. .  Patient states \"I feel like I got ran over by a truck\"  Patient denies nausea, vomiting, and diarrhea. Patient states she is still able to drink water with crystal light flavoring. Patient inquiring if there was any correlation between bypass patients and covid. I [facility] ( [facility] and she stated no there is no correlation. I advised patient of message. Patient states will go to ER if she need to.

## 2021-04-09 LAB — CREATININE, EXTERNAL: 0.57

## 2021-05-25 ENCOUNTER — DOCUMENTATION ONLY (OUTPATIENT)
Dept: SURGERY | Age: 46
End: 2021-05-25

## 2021-05-25 NOTE — PROGRESS NOTES
Per [facility] requirements;  E-mail and letter sent for follow up appointment. [facility] Joanie Spencer 94      Dear Patient,    Your health is our main concern. It is important for your health to have follow-up lab work and to see your surgeon at 3 months, 6 months and annually after your weight loss surgery. Additionally, the Department of bariatric Surgery at our hospital is a member of the [employer] [address] Surgical Quality Improvement Program ([facility] NSQIP). As a participant in this program, we gather information on the outcomes of our patients after surgery. Please call the office for a follow up appointment at [phone] [facility]) or [phone] [facility]). If you have moved out of the area or have changed surgeons please call us and let us know the name of your doctor. Your health and feedback are important to us. We greatly appreciate your response.        Thank you,  [facility] [address] [facility]

## 2021-06-09 ENCOUNTER — TELEPHONE (OUTPATIENT)
Dept: SURGERY | Age: 46
End: 2021-06-09

## 2021-06-09 DIAGNOSIS — Z98.84 HISTORY OF ROUX-EN-Y GASTRIC BYPASS: Primary | ICD-10-CM

## 2021-06-09 DIAGNOSIS — K91.2 POST-RESECTION MALABSORPTION: ICD-10-CM

## 2021-07-20 ENCOUNTER — HOSPITAL ENCOUNTER (OUTPATIENT)
Dept: LAB | Age: 46
Discharge: HOME | End: 2021-07-20
Payer: COMMERCIAL

## 2021-07-20 LAB
25(OH)D3 SERPL-MCNC: 18.9 NG/ML (ref 30–100)
ALBUMIN SERPL-MCNC: 4.1 G/DL (ref 3.4–5)
ANION GAP SERPL CALC-SCNC: 2 MMOL/L (ref 3–18)
BASOPHILS # BLD: 0 K/UL (ref 0–0.1)
BASOPHILS NFR BLD: 1 % (ref 0–2)
BUN SERPL-MCNC: 16 MG/DL (ref 7–18)
BUN/CREAT SERPL: 25 (ref 12–20)
CALCIUM SERPL-MCNC: 9.5 MG/DL (ref 8.5–10.1)
CHLORIDE SERPL-SCNC: 109 MMOL/L (ref 100–111)
CO2 SERPL-SCNC: 32 MMOL/L (ref 21–32)
CREAT SERPL-MCNC: 0.63 MG/DL (ref 0.6–1.3)
DIFFERENTIAL METHOD BLD: ABNORMAL
EOSINOPHIL # BLD: 0.2 K/UL (ref 0–0.4)
EOSINOPHIL NFR BLD: 4 % (ref 0–5)
ERYTHROCYTE [DISTWIDTH] IN BLOOD BY AUTOMATED COUNT: 11.9 % (ref 11.6–14.5)
FERRITIN SERPL-MCNC: 20 NG/ML (ref 8–388)
FOLATE SERPL-MCNC: 10.8 NG/ML (ref 3.1–17.5)
GLUCOSE SERPL-MCNC: 72 MG/DL (ref 74–99)
HCT VFR BLD AUTO: 42.6 % (ref 35–45)
HGB BLD-MCNC: 13.6 G/DL (ref 12–16)
IRON SERPL-MCNC: 121 UG/DL (ref 50–175)
LYMPHOCYTES # BLD: 2.3 K/UL (ref 0.9–3.6)
LYMPHOCYTES NFR BLD: 52 % (ref 21–52)
MCH RBC QN AUTO: 28.7 PG (ref 24–34)
MCHC RBC AUTO-ENTMCNC: 31.9 G/DL (ref 31–37)
MCV RBC AUTO: 89.9 FL (ref 74–97)
MONOCYTES # BLD: 0.4 K/UL (ref 0.05–1.2)
MONOCYTES NFR BLD: 8 % (ref 3–10)
NEUTS SEG # BLD: 1.6 K/UL (ref 1.8–8)
NEUTS SEG NFR BLD: 35 % (ref 40–73)
PLATELET # BLD AUTO: 219 K/UL (ref 135–420)
PMV BLD AUTO: 10.2 FL (ref 9.2–11.8)
POTASSIUM SERPL-SCNC: 5.5 MMOL/L (ref 3.5–5.5)
RBC # BLD AUTO: 4.74 M/UL (ref 4.2–5.3)
SODIUM SERPL-SCNC: 143 MMOL/L (ref 136–145)
VIT B12 SERPL-MCNC: 296 PG/ML (ref 211–911)
WBC # BLD AUTO: 4.4 K/UL (ref 4.6–13.2)

## 2021-07-20 PROCEDURE — 82607 VITAMIN B-12: CPT

## 2021-07-20 PROCEDURE — 82306 VITAMIN D 25 HYDROXY: CPT

## 2021-07-20 PROCEDURE — 84425 ASSAY OF VITAMIN B-1: CPT

## 2021-07-20 PROCEDURE — 80048 BASIC METABOLIC PNL TOTAL CA: CPT

## 2021-07-20 PROCEDURE — 82040 ASSAY OF SERUM ALBUMIN: CPT

## 2021-07-20 PROCEDURE — 83540 ASSAY OF IRON: CPT

## 2021-07-20 PROCEDURE — 85025 COMPLETE CBC W/AUTO DIFF WBC: CPT

## 2021-07-20 PROCEDURE — 36415 COLL VENOUS BLD VENIPUNCTURE: CPT

## 2021-07-20 PROCEDURE — 82728 ASSAY OF FERRITIN: CPT

## 2021-07-26 LAB — VIT B1 BLD-SCNC: 111.5 NMOL/L (ref 66.5–200)

## 2021-08-05 ENCOUNTER — OFFICE VISIT (OUTPATIENT)
Dept: SURGERY | Age: 46
End: 2021-08-05
Payer: COMMERCIAL

## 2021-08-05 VITALS
WEIGHT: 129.4 LBS | BODY MASS INDEX: 22.93 KG/M2 | OXYGEN SATURATION: 96 % | HEART RATE: 68 BPM | HEIGHT: 63 IN | SYSTOLIC BLOOD PRESSURE: 97 MMHG | DIASTOLIC BLOOD PRESSURE: 63 MMHG | TEMPERATURE: 98.4 F

## 2021-08-05 DIAGNOSIS — Z98.84 S/P GASTRIC BYPASS: ICD-10-CM

## 2021-08-05 DIAGNOSIS — Z86.39 HX OF OBESITY: ICD-10-CM

## 2021-08-05 DIAGNOSIS — Z71.3 ENCOUNTER FOR WEIGHT LOSS COUNSELING: ICD-10-CM

## 2021-08-05 DIAGNOSIS — K91.2 POST-RESECTION MALABSORPTION: Primary | ICD-10-CM

## 2021-08-05 PROCEDURE — 99212 OFFICE O/P EST SF 10 MIN: CPT | Performed by: NURSE PRACTITIONER

## 2021-08-05 NOTE — PROGRESS NOTES
Bariatric Postoperative Progress Note    CC: Annual LGBP Follow Up    Duc Hare is a 42 y.o. female now 3 years status post laparoscopic adjustable gastric band surgery performed on 7/2/2018. Doing well overall. Currently eating protein shakes, chicken seafood. Taking in 64 oz fluids daily. At least 30 min of activity 7 days a week with walking/running/kickboxing. Bowel movements are regular. She reports no pain or concerns. Her father passed in April, she was the caregiver for him before he passed and then had to help her mother move. She was not taking vitamin supplementation daily during this time,was only taking 1-2 days weekly. Working on taking daily now. Weight geraldo 115 the year after surgery. Will get dumping when eating high density carbohydrates which she avoids these types of foods.     Pt goal 120 lbs    Weight Loss Metrics 8/5/2021 8/5/2021 9/10/2020 9/10/2020 2/24/2020 7/2/2019 7/2/2019   Pre op / Initial Wt 216 - 216 - - 216 -   Today's Wt - 129 lb 6.4 oz - 120 lb 118 lb 3.2 oz - 111 lb   BMI - 22.92 kg/m2 - 21.26 kg/m2 20.94 kg/m2 - 19.66 kg/m2   Ideal Body Wt 125 - 125 - - 125 -   Excess Body Wt 91 - 91 - - 91 -   Goal Wt 143.2 - 143 - - 143 -   Wt loss to date 86.6 - 96 - - 105 -   % Wt Loss 1.19 - 1.32 - - 1.44 -   80% EBW 72.8 - 72.8 - - 72.8 -       Comorbidities:  Hypertension: not applicable  Diabetes: not applicable  Obstructive Sleep Apnea: resolved  Hyperlipidemia: not applicable  Stress Urinary Incontinence: not applicable  Gastroesophageal Reflux: not applicable  Weight related arthropathy:resolved        Past Medical History:   Diagnosis Date    Asthma     exercised induced    Autoimmune disease ([facility] [address]     Rheumatoid arthrtis    Morbid obesity ([facility] [address]     Morbid obesity ([facility] [address] 4/25/2018    Nausea & vomiting     Rheumatoid arthritis ([facility] [address] 2016    Sleep apnea     Uses CPAP       Past Surgical History:   Procedure Laterality Date    HX APPENDECTOMY  1994    HX  SECTION      HX CHOLECYSTECTOMY      HX GASTRIC BYPASS  2018    HX HYSTERECTOMY  2014       Current Outpatient Medications   Medication Sig Dispense Refill    albuterol (PROVENTIL HFA, VENTOLIN HFA, PROAIR HFA) 90 mcg/actuation inhaler Take 2 Puffs by inhalation every six (6) hours as needed for Wheezing. Take  inhaled by mouth. 1 Inhaler 5    cholecalciferol, vitamin D3, (VITAMIN D3 PO) Take  by mouth. Bariatric Vitamin D daily      pediatric multivitamins chewable tablet Take 1 Tab by mouth daily.  Calcium-Cholecalciferol, D3, 600 mg(1,500mg) -400 unit chew Take 1 Tab by mouth daily.  cyanocobalamin (VITAMIN B-12) 1,000 mcg sublingual tablet Take 1,000 mcg by mouth daily.  estradiol (ESTRACE) 2 mg tablet TAKE 1 TABLET BY MOUTH EVERY DAY  5       Allergies   Allergen Reactions    Promethazine Unknown (comments)     Other reaction(s): psychological reaction  Hallucinations       ROS:  Review of Systems   Constitutional: Positive for weight loss. Negative for malaise/fatigue. Eyes: Negative for blurred vision, double vision and pain. Respiratory: Negative for cough and shortness of breath. Cardiovascular: Negative for chest pain, palpitations and leg swelling. Gastrointestinal: Negative for abdominal pain, constipation, diarrhea, heartburn, nausea and vomiting. Genitourinary: Negative for dysuria and urgency. Skin: Negative for itching and rash. Neurological: Negative for dizziness, tingling, seizures, loss of consciousness and headaches. Physicial Exam:  Visit Vitals  BP 97/63 (BP 1 Location: Right arm, BP Patient Position: Sitting, BP Cuff Size: Small adult)   Pulse 68   Temp 98.4 °F (36.9 °C) (Temporal)   Ht 5' 3\" (1.6 m)   Wt 58.7 kg (129 lb 6.4 oz)   SpO2 96%   BMI 22.92 kg/m²     Physical Exam  Vitals and nursing note reviewed. Constitutional:       Appearance: Normal appearance. HENT:      Head: Normocephalic and atraumatic.    Cardiovascular: Rate and Rhythm: Normal rate and regular rhythm. Pulses: Normal pulses. Heart sounds: Normal heart sounds. Pulmonary:      Effort: Pulmonary effort is normal.      Breath sounds: Normal breath sounds. Abdominal:      General: Bowel sounds are normal. There is no distension. Palpations: Abdomen is soft. There is no mass. Tenderness: There is no abdominal tenderness. Musculoskeletal:         General: Normal range of motion. Skin:     General: Skin is warm and dry. Neurological:      General: No focal deficit present. Mental Status: She is alert and oriented to person, place, and time. Psychiatric:         Mood and Affect: Mood normal.         Behavior: Behavior normal.         Labs:   Recent Results (from the past 672 hour(s))   ALBUMIN    Collection Time: 07/20/21 10:50 AM   Result Value Ref Range    Albumin 4.1 3.4 - 5.0 g/dL   VITAMIN B12 & FOLATE    Collection Time: 07/20/21 10:50 AM   Result Value Ref Range    Vitamin B12 296 211 - 911 pg/mL    Folate 10.8 3.10 - 17.50 ng/mL   CBC WITH AUTOMATED DIFF    Collection Time: 07/20/21 10:50 AM   Result Value Ref Range    WBC 4.4 (L) 4.6 - 13.2 K/uL    RBC 4.74 4.20 - 5.30 M/uL    HGB 13.6 12.0 - 16.0 g/dL    HCT 42.6 35.0 - 45.0 %    MCV 89.9 74.0 - 97.0 FL    MCH 28.7 24.0 - 34.0 PG    MCHC 31.9 31.0 - 37.0 g/dL    RDW 11.9 11.6 - 14.5 %    PLATELET 522 074 - 865 K/uL    MPV 10.2 9.2 - 11.8 FL    NEUTROPHILS 35 (L) 40 - 73 %    LYMPHOCYTES 52 21 - 52 %    MONOCYTES 8 3 - 10 %    EOSINOPHILS 4 0 - 5 %    BASOPHILS 1 0 - 2 %    ABS. NEUTROPHILS 1.6 (L) 1.8 - 8.0 K/UL    ABS. LYMPHOCYTES 2.3 0.9 - 3.6 K/UL    ABS. MONOCYTES 0.4 0.05 - 1.2 K/UL    ABS. EOSINOPHILS 0.2 0.0 - 0.4 K/UL    ABS.  BASOPHILS 0.0 0.0 - 0.1 K/UL    DF AUTOMATED     METABOLIC PANEL, BASIC    Collection Time: 07/20/21 10:50 AM   Result Value Ref Range    Sodium 143 136 - 145 mmol/L    Potassium 5.5 3.5 - 5.5 mmol/L    Chloride 109 100 - 111 mmol/L    CO2 32 21 - 32 mmol/L    Anion gap 2 (L) 3.0 - 18 mmol/L    Glucose 72 (L) 74 - 99 mg/dL    BUN 16 7.0 - 18 MG/DL    Creatinine 0.63 0.6 - 1.3 MG/DL    BUN/Creatinine ratio 25 (H) 12 - 20      GFR est AA >60 >60 ml/min/1.73m2    GFR est non-AA >60 >60 ml/min/1.73m2    Calcium 9.5 8.5 - 10.1 MG/DL   FERRITIN    Collection Time: 07/20/21 10:50 AM   Result Value Ref Range    Ferritin 20 8 - 388 NG/ML   IRON    Collection Time: 07/20/21 10:50 AM   Result Value Ref Range    Iron 121 50 - 175 ug/dL   VITAMIN B1, WHOLE BLOOD    Collection Time: 07/20/21 10:50 AM   Result Value Ref Range    Vitamin B1 111.5 66.5 - 200.0 nmol/L   VITAMIN D, 25 HYDROXY    Collection Time: 07/20/21 10:52 AM   Result Value Ref Range    Vitamin D 25-Hydroxy 18.9 (L) 30 - 100 ng/mL       Assessment/Plan:   She is currently 3 years s/p laparoscopic gastric bypass surgery with a total weight loss of 86 lbs to date, concerned with 9 lb weight gain over past year. She was not focusing on diet, supplements, or activity due to loss of father. She is back on track now and desires to lose the 9 lbs. Labs were reviewed and pt was instructed to continue MVI/B1/B12/D supplementation daily. Increase vitamin D to 10,000 units daily. We have reviewed the components of a successful postoperative course including requirement for a high protein, low carbohydrate diet, 64 oz a day of zero calorie liquids, daily vitamin supplementation, daily exercise (150 mis/week), regular follow-up, and participation in support groups. Refer to registered dietitian for more detailed medical nutrition therapy as needed. The primary encounter diagnosis was Post-resection malabsorption. Diagnoses of S/P gastric bypass, Hx of obesity, BMI 22.0-22.9, adult, and Encounter for weight loss counseling were also pertinent to this visit. Follow-up and Dispositions    · Return in about 1 year (around 8/5/2022), or if symptoms worsen or fail to improve.        with labs, sooner as needed.   Osbaldo Vazquez NP

## 2021-08-05 NOTE — PROGRESS NOTES
Duc Hare is a 42 y.o. female (: 1975) presenting to address:    Chief Complaint   Patient presents with    Follow-up     Yearly follow up/ LGBP 2018- Dr Dre Collado       Medication list and allergies have been reviewed with Duc Hare and updated as of today's date. I have gone over all Medical, Surgical and Social History with Duc Hare and updated/added the information accordingly. 1. Have you been to the ER, Urgent Care or Hospitalized since your last visit? YES. Covid positive in 2021      2. Have you followed up with your PCP or any other Physicians since your procedure/ last office visit?    NO

## 2021-08-05 NOTE — PATIENT INSTRUCTIONS
Please follow up with the dietitian.        Sarah Jeter RD,-- Bariatric Dietitian at [facility] [email or web address] / Beryl Rojas RD -- Bariatric Dietitian at [facility]  [phone] / [email or web address]      Take 10,000 units of Vitamin D daily

## 2022-03-18 PROBLEM — E88.01 HETEROZYGOUS ALPHA 1-ANTITRYPSIN DEFICIENCY (HCC): Status: ACTIVE | Noted: 2020-08-26

## 2022-03-19 PROBLEM — K91.2 POST-RESECTION MALABSORPTION: Status: ACTIVE | Noted: 2018-07-18

## 2022-03-19 PROBLEM — Z99.89 OSA ON CPAP: Status: ACTIVE | Noted: 2018-04-25

## 2022-03-19 PROBLEM — E55.9 HYPOVITAMINOSIS D: Status: ACTIVE | Noted: 2018-04-25

## 2022-03-19 PROBLEM — G47.33 OSA ON CPAP: Status: ACTIVE | Noted: 2018-04-25

## 2022-03-19 PROBLEM — J45.20 MILD INTERMITTENT ASTHMA WITHOUT COMPLICATION: Status: ACTIVE | Noted: 2019-06-03

## 2022-06-01 ENCOUNTER — DOCUMENTATION ONLY (OUTPATIENT)
Dept: SURGERY | Age: 47
End: 2022-06-01

## 2022-06-01 NOTE — PROGRESS NOTES
Per [facility] requirements;  E-mail and letter sent for follow up appointment. [facility] Loss Joanie Spencer 94      Dear Patient,    Your health is our main concern. It is important for your health to have follow-up lab work and to see your surgeon at 3 months, 6 months and annually after your weight loss surgery. Additionally, the Department of bariatric Surgery at our hospital is a member of the Metabolic and Bariatric Surgery Accreditation and Quality Improvement Program [facility]). As a participant in this program, we gather information on the outcomes of our patients after surgery. Please call the office for a follow up appointment at [phone] [place]) or [phone] [facility]). If you have moved out of the area or have changed surgeons please call us and let us know the name of your doctor. Your health and feedback are important to us. We greatly appreciate your response.        Thank you,  [facility] Loss [address] [facility]

## 2023-06-01 NOTE — NURSE NAVIGATOR
Per MBSAQIP requirements:  Letter and email sent requesting follow up appointment. Marely Ron Mendez [place] Loss Gage Hidalgo [address]      Dear Patient,    Your health is our main concern. It is important for your health to have follow-up lab work and to see your surgeon at 3 months, 6 months and annually after your weight loss surgery. Additionally, the Department of bariatric Surgery at our hospital is a member of the Metabolic and Bariatric Surgery Accreditation and Quality Improvement Program [facility]). As a participant in this program, we gather information on the outcomes of our patients after surgery. Please call the office for a follow up appointment at [phone] [facility]) or [phone] [facility]). If you have moved out of the area or have changed surgeons please call us and let us know the name of your doctor. Your health and feedback are important to us. We greatly appreciate your response.        Thank you,  Marely Velasquez Andrea [place] [facility] [address] [facility]

## 2024-07-29 NOTE — MR AVS SNAPSHOT
[facility] 
 
 
 [address] [place] [address] [address] 
[phone] Patient: Beatrice Barragan MRN: [id number] :1975 Visit Information Date & Time Provider Department Dept. Phone Encounter #  
 2018  1:30 PM Liban Dubose MD [facility] [phone] [id number] Your Appointments 3/22/2018 10:30 AM  
NUTRITION COUNSELING with [facility] DIETICIAN [address] ([facility] Appt Note: 1 of 3 LG  
 [address] [place] [address] [place] 2018 10:30 AM  
NUTRITION COUNSELING with [facility] DIETICIAN [address] ([facility] Appt Note: 2 of 3 lg [address] [place] [address] [address]  
[phone]  
  
    
 2018 10:00 AM  
Follow Up with Liban Dubose MD  
[address] [facility]) Appt Note: mid trail apt  
 [address] [place] [address] 2018 10:30 AM  
NUTRITION COUNSELING with [facility] DIETICIAN [address] ([facility] Appt Note: 3 of 3  
 [facility] [address] [place] [address] [address]  
[phone] Upcoming Health Maintenance Date Due DTaP/Tdap/Td series (1 - Tdap) 1998 PAP AKA CERVICAL CYTOLOGY 1998 Influenza Age 5 to Adult 2017 Allergies as of 2018  Never Reviewed Severity Noted Reaction Type Reactions Promethazine Medium 2008    Unknown (comments) Other reaction(s): psychological reaction Hallucinations Current Immunizations  Never Reviewed No immunizations on file. Not reviewed this visit Daughter, Nona, calling back. Has a release on file and was transferred over to a [facility] to pass the message along. Thank you team.   You Were Diagnosed With   
  
 Codes Comments Morbid obesity ([facility]    -  Primary ICD-10-CM: E66.01 
ICD-9-CM: 278.01   
 BMI 40.0-44.9, adult ([facility].)     ICD-10-CM: Z68.41 
ICD-9-CM: V85.41 Hypovitaminosis D     ICD-10-CM: E55.9 ICD-9-CM: 268.9 Vitals BP Pulse Temp Resp Height(growth percentile) Weight(growth percentile) 137/87 (BP 1 Location: Right arm, BP Patient Position: Sitting) 72 98.4 °F (36.9 °C) (Oral) 15 5' 2\" (1.575 m) 223 lb 3.2 oz (101.2 kg) SpO2 BMI Smoking Status 100% 40.82 kg/m2 Never Smoker BMI and BSA Data Body Mass Index Body Surface Area  
 40.82 kg/m 2 2.1 m 2 Your Updated Medication List  
  
   
This list is accurate as of 2/28/18  2:21 PM.  Always use your most recent med list.  
  
  
  
  
 albuterol 90 mcg/actuation inhaler Commonly known as:  PROVENTIL HFA, VENTOLIN HFA, PROAIR HFA Take  inhaled by mouth.  
  
 ergocalciferol 50,000 unit capsule Commonly known as:  ERGOCALCIFEROL  
50,000 Units. estradiol 2 mg tablet Commonly known as:  ESTRACE  
TAKE 1 TABLET BY MOUTH EVERY DAY  
  
 hydroxychloroquine 200 mg tablet Commonly known as:  PLAQUENIL  
TAKE 1 TABLET BY MOUTH TWICE A DAY WITH FOOD OR MILK NEXIUM PO Take  by Mouth. To-Do List   
 02/28/2018 Lab:  CBC WITH AUTOMATED DIFF   
  
 02/28/2018 ECG:  EKG, 12 LEAD, INITIAL   
  
 02/28/2018 Lab:  FERRITIN   
  
 02/28/2018 Lab:  IRON   
  
 02/28/2018 Lab:  LIPID PANEL   
  
 02/28/2018 Lab:  METABOLIC PANEL, COMPREHENSIVE   
  
 02/28/2018 Lab:  TSH 3RD GENERATION   
  
 02/28/2018 Lab:  VITAMIN B1, WHOLE BLOOD   
  
 02/28/2018 Lab:  VITAMIN B12 & FOLATE   
  
 02/28/2018 Lab:  VITAMIN D, 25 HYDROXY Patient Instructions If you have any questions or concerns about today's appointment, the verbal and/or written instructions you were given for follow up care, please call our office at [phone]. [facility] [place] [id number] [place] [address], [address] 
 
[phone] office [phone] Introducing [facility]! [facility] introduces [employer] patient portal. Now you can access parts of your medical record, email your doctor's office, and request medication refills online. 1. In your internet browser, go to [email or web address] 2. Click on the First Time User? Click Here link in the Sign In box. You will see the New Member Sign Up page. 3. Enter your [employer] Access Code exactly as it appears below. You will not need to use this code after youve completed the sign-up process. If you do not sign up before the expiration date, you must request a new code. · [employer] Access Code: [email or web address] Expires: 5/29/2018  1:10 PM 
 
4. Enter the last four digits of your Social Security Number (xxxx) and Date of Birth (mm/dd/yyyy) as indicated and click Submit. You will be taken to the next sign-up page. 5. Create a [employer] ID. This will be your [employer] login ID and cannot be changed, so think of one that is secure and easy to remember. 6. Create a [employer] password. You can change your password at any time. 7. Enter your Password Reset Question and Answer. This can be used at a later time if you forget your password. 8. Enter your e-mail address. You will receive e-mail notification when new information is available in [address] 9. Click Sign Up. You can now view and download portions of your medical record. 10. Click the Download Summary menu link to download a portable copy of your medical information. If you have questions, please visit the Frequently Asked Questions section of the [employer] website. Remember, [employer] is NOT to be used for urgent needs. For medical emergencies, dial 911. Now available from your iPhone and Android! Please provide this summary of care documentation to your next provider. Your primary care clinician is listed as Percy Briscoe. If you have any questions after today's visit, please call [phone].

## (undated) DEVICE — SUTURE VCRL SZ 2-0 L54IN ABSRB VLT W/O NDL POLYGLACTIN 910 J618H

## (undated) DEVICE — LIGHT HANDLE: Brand: DEVON

## (undated) DEVICE — SOL IRR STRL H2O 500ML STRL --

## (undated) DEVICE — TRUE CONTENT TO BE POPULATED AS PART OF REBRANDING: Brand: ARGYLE

## (undated) DEVICE — GLOVE SURG SZ 7.5 L11.73IN FNGR THK7.5MIL STRW LTX POLYMER

## (undated) DEVICE — SHEAR HARMONIC ACET 5MMX36CM -- ACE PLUS

## (undated) DEVICE — PREP SKN CHLORHEX GLUC 8OZ --

## (undated) DEVICE — SHEARS: Brand: ENDO SHEARS

## (undated) DEVICE — BLADELESS OPTICAL TROCAR WITH FIXATION CANNULA: Brand: VERSAONE

## (undated) DEVICE — DERMABOND SKIN ADH 0.7ML -- DERMABOND ADVANCED 12/BX

## (undated) DEVICE — SUT SLK 2-0SH 30IN BLK --

## (undated) DEVICE — STAPLE INT WHT BLU G GRN BLK REINF FOR ENDOPATH ECHELON FLX

## (undated) DEVICE — KENDALL SCD EXPRESS SLEEVES, KNEE LENGTH, MEDIUM: Brand: KENDALL SCD

## (undated) DEVICE — KIT CATH OD16FR 5ML BLLN SIL URIN INDWL STR TIP INF CTRL

## (undated) DEVICE — BLADELESS OPTICAL TROCAR WITH FIXATION CANNULA: Brand: VERSAPORT

## (undated) DEVICE — UNIVERSAL FIXATION CANNULA: Brand: VERSAONE

## (undated) DEVICE — DISPOSABLE SUCTION/IRRIGATOR TUBE SET WITH TIP: Brand: AHTO

## (undated) DEVICE — REM POLYHESIVE ADULT PATIENT RETURN ELECTRODE: Brand: VALLEYLAB

## (undated) DEVICE — STAPLER SKIN L440MM 32MM LNG 12 FIRING B FRM PWR + GRIPPING

## (undated) DEVICE — RELOAD STPL L60MM H1-2.6MM MESENTERY THN TISS WHT 6 ROW

## (undated) DEVICE — SUTURE MCRYL SZ 4-0 L27IN ABSRB UD L24MM PS-1 3/8 CIR PRIM Y935H

## (undated) DEVICE — VISUALIZATION SYSTEM: Brand: CLEARIFY

## (undated) DEVICE — INTENDED FOR TISSUE SEPARATION, AND OTHER PROCEDURES THAT REQUIRE A SHARP SURGICAL BLADE TO PUNCTURE OR CUT.: Brand: BARD-PARKER ® CARBON RIB-BACK BLADES

## (undated) DEVICE — RELOAD STPL L60MM H1.5-3.6MM REG TISS BLU GRIPPING SURF B

## (undated) DEVICE — PACK PROCEDURE SURG LAPAROSCOPY 17X7 MM BRTRC PRIMUS

## (undated) DEVICE — SUTURE VCRL SZ 3-0 L27IN ABSRB VLT L26MM SH 1/2 CIR J316H